# Patient Record
Sex: FEMALE | Race: WHITE | NOT HISPANIC OR LATINO
[De-identification: names, ages, dates, MRNs, and addresses within clinical notes are randomized per-mention and may not be internally consistent; named-entity substitution may affect disease eponyms.]

---

## 2018-01-13 ENCOUNTER — TRANSCRIPTION ENCOUNTER (OUTPATIENT)
Age: 63
End: 2018-01-13

## 2018-06-01 ENCOUNTER — TRANSCRIPTION ENCOUNTER (OUTPATIENT)
Age: 63
End: 2018-06-01

## 2018-07-17 ENCOUNTER — TRANSCRIPTION ENCOUNTER (OUTPATIENT)
Age: 63
End: 2018-07-17

## 2018-10-20 ENCOUNTER — TRANSCRIPTION ENCOUNTER (OUTPATIENT)
Age: 63
End: 2018-10-20

## 2018-11-17 ENCOUNTER — OUTPATIENT (OUTPATIENT)
Dept: OUTPATIENT SERVICES | Facility: HOSPITAL | Age: 63
LOS: 1 days | Discharge: HOME | End: 2018-11-17

## 2018-11-17 DIAGNOSIS — K46.9 UNSPECIFIED ABDOMINAL HERNIA WITHOUT OBSTRUCTION OR GANGRENE: ICD-10-CM

## 2019-10-14 ENCOUNTER — EMERGENCY (EMERGENCY)
Facility: HOSPITAL | Age: 64
LOS: 0 days | Discharge: HOME | End: 2019-10-14
Attending: EMERGENCY MEDICINE | Admitting: EMERGENCY MEDICINE
Payer: OTHER MISCELLANEOUS

## 2019-10-14 VITALS
HEART RATE: 95 BPM | OXYGEN SATURATION: 98 % | DIASTOLIC BLOOD PRESSURE: 88 MMHG | RESPIRATION RATE: 19 BRPM | TEMPERATURE: 98 F | WEIGHT: 169.98 LBS | SYSTOLIC BLOOD PRESSURE: 165 MMHG

## 2019-10-14 DIAGNOSIS — Z88.1 ALLERGY STATUS TO OTHER ANTIBIOTIC AGENTS STATUS: ICD-10-CM

## 2019-10-14 DIAGNOSIS — Z88.0 ALLERGY STATUS TO PENICILLIN: ICD-10-CM
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
## 2019-10-14 DIAGNOSIS — S20.219A CONTUSION OF UNSPECIFIED FRONT WALL OF THORAX, INITIAL ENCOUNTER: ICD-10-CM

## 2019-10-14 DIAGNOSIS — T74.11XA ADULT PHYSICAL ABUSE, CONFIRMED, INITIAL ENCOUNTER: ICD-10-CM

## 2019-10-14 PROCEDURE — 99284 EMERGENCY DEPT VISIT MOD MDM: CPT

## 2019-10-14 PROCEDURE — 71046 X-RAY EXAM CHEST 2 VIEWS: CPT | Mod: 26

## 2019-10-14 RX ORDER — ONDANSETRON 8 MG/1
4 TABLET, FILM COATED ORAL ONCE
Refills: 0 | Status: COMPLETED | OUTPATIENT
Start: 2019-10-14 | End: 2019-10-14

## 2019-10-14 RX ADMIN — ONDANSETRON 4 MILLIGRAM(S): 8 TABLET, FILM COATED ORAL at 22:20

## 2019-10-14 NOTE — ED PROVIDER NOTE - PHYSICAL EXAMINATION
Vital Signs: I have reviewed the initial vital signs.  Constitutional: well-nourished, no acute distress, normocephalic  Cardiovascular: regular rate, regular rhythm, no murmur appreciated  Respiratory: unlabored respiratory effort, clear to auscultation bilaterally  Gastrointestinal: soft, non-tender, non-distended  abdomen, no pulsatile mass  Musculoskeletal: supple neck, no lower extremity edema, lower sternal soreness to palpation,   Integumentary: warm, dry, no rash  Neurologic: awake, alert, cranial nerves II-XII grossly intact, extremities’ motor and sensory functions grossly intact, no focal deficits, GCS 15  Psychiatric: appropriate mood, appropriate affect

## 2019-10-14 NOTE — ED PROVIDER NOTE - PATIENT PORTAL LINK FT
You can access the FollowMyHealth Patient Portal offered by [facility] by registering at the following website: [email or web address]. By joining [facility] FollowMyHealth portal, you will also be able to view your health information using other applications (apps) compatible with our system.

## 2019-10-14 NOTE — ED ADULT NURSE NOTE - NSIMPLEMENTINTERV_GEN_ALL_ED
Implemented All Universal Safety Interventions:  [place] to call system. Call bell, personal items and telephone within reach. Instruct patient to call for assistance. Room bathroom lighting operational. Non-slip footwear when patient is off stretcher. Physically safe environment: no spills, clutter or unnecessary equipment. Stretcher in lowest position, wheels locked, appropriate side rails in place.
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
## 2019-10-14 NOTE — ED PROVIDER NOTE - NS ED ROS FT
Review of Systems    Constitutional: (-) fever/ chills (-) weight loss  Cardiovascular: (-) chest pain, (-) syncope (-) palpitations  Respiratory: (-) cough, (-) shortness of breath  Gastrointestinal: (-) vomiting, (-) diarrhea (-) abdominal pain  Musculoskeletal: (-) neck pain, (-) back pain, (-) joint pain (-) pedal edema, (+) sternal soreness  Integumentary: (-) rash, (-) swelling  Neurological: (-) headache, (-) altered mental status  Psychiatric: (-) hallucinations or depression   Allergic/Immunologic: (-) pruritus
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
## 2019-11-14 ENCOUNTER — APPOINTMENT (OUTPATIENT)
Dept: SURGERY | Facility: CLINIC | Age: 64
End: 2019-11-14
Payer: COMMERCIAL

## 2019-11-14 VITALS — BODY MASS INDEX: 25.77 KG/M2 | WEIGHT: 174 LBS | HEIGHT: 69 IN

## 2019-11-14 PROBLEM — Z00.00 ENCOUNTER FOR PREVENTIVE HEALTH EXAMINATION: Status: ACTIVE | Noted: 2019-11-14

## 2019-11-14 PROCEDURE — 99243 OFF/OP CNSLTJ NEW/EST LOW 30: CPT

## 2019-11-14 NOTE — ASSESSMENT
[FreeTextEntry1] : Phyllis is a pleasant 64-year-old woman presented to the office with her daughter referred by her  (who is a patient of mine) with a past medical history significant for hypertension, cigarette smoking and 4 C-sections in the past along with a laparoscopic cholecystectomy approximately 18 years ago by another surgeon who was diagnosed with a periumbilical hernia one year ago but recently began experiencing significant pain and discomfort in this area which is dramatically worsened over the last 2 weeks.\par \par Physical examination demonstrates a tender plum-sized bulge just above the umbilicus which is not reducible consistent with an incarcerated periumbilical incisional hernia (related to her previous laparoscopic cholecystectomy trocar site incision) warranting surgical repair. There is no evidence of strangulation and the patient denies any symptoms of obstruction. She does have a mild diastasis recti which is most likely related to her four previous full-term pregnancies.\par \par I explained the pros and cons of surgery, as well as all risks, benefits, indications and alternatives of the procedure and the patient understood and agreed.  The patient would like this done as soon as possible in light of progressively worsening symptoms. Phyllis was scheduled for the repair of her incarcerated periumbilical incisional hernia with mesh on Wednesday, December 18, 2019 (and sooner if a spot becomes available) under local with IV sed[facility] at [facility] with presurgical testing waived.  The patient was encouraged to avoid heavy lifting and strenuous activity in the interim, of course.\par \par After our discussion, the patient is aware of the dangers of cigarette smoking, especially with regard to wound healing, wound complications, hernia development and hernia recurrence.  The patient was encouraged to quit or minimize smoking in the perioperative period and has agreed to try.

## 2019-11-14 NOTE — CONSULT LETTER
[Dear  ___] : Dear  [unfilled], [Courtesy Letter:] : I had the pleasure of seeing your patient, [unfilled], in my office today. [Please see my note below.] : Please see my note below. [Consult Closing:] : Thank you very much for allowing me to participate in the care of this patient.  If you have any questions, please do not hesitate to contact me. [FreeTextEntry3] : Respectfully,\par \par Raman Evans M.D., FACS\par

## 2019-11-14 NOTE — PHYSICAL EXAM
[No Rash or Lesion] : No rash or lesion [Normal Breath Sounds] : Normal breath sounds [Calm] : calm [Alert] : alert [JVD] : no jugular venous distention  [de-identified] : healthy [de-identified] : normal [de-identified] : mildly protuberant abdomen, mild diastasis recti\par \par  [de-identified] : large periumbilical incisional hernia, incarcerated

## 2019-12-11 ENCOUNTER — TRANSCRIPTION ENCOUNTER (OUTPATIENT)
Age: 64
End: 2019-12-11

## 2019-12-18 ENCOUNTER — APPOINTMENT (OUTPATIENT)
Dept: SURGERY | Facility: AMBULATORY SURGERY CENTER | Age: 64
End: 2019-12-18
Payer: COMMERCIAL

## 2019-12-18 ENCOUNTER — RESULT REVIEW (OUTPATIENT)
Age: 64
End: 2019-12-18

## 2019-12-18 ENCOUNTER — OUTPATIENT (OUTPATIENT)
Dept: OUTPATIENT SERVICES | Facility: HOSPITAL | Age: 64
LOS: 1 days | Discharge: HOME | End: 2019-12-18
Payer: COMMERCIAL

## 2019-12-18 VITALS
RESPIRATION RATE: 18 BRPM | DIASTOLIC BLOOD PRESSURE: 70 MMHG | HEART RATE: 82 BPM | SYSTOLIC BLOOD PRESSURE: 123 MMHG | OXYGEN SATURATION: 100 %

## 2019-12-18 VITALS
HEART RATE: 94 BPM | TEMPERATURE: 98 F | HEIGHT: 69.5 IN | OXYGEN SATURATION: 97 % | WEIGHT: 171.96 LBS | DIASTOLIC BLOOD PRESSURE: 82 MMHG | SYSTOLIC BLOOD PRESSURE: 145 MMHG | RESPIRATION RATE: 18 BRPM

## 2019-12-18 DIAGNOSIS — Z90.49 ACQUIRED ABSENCE OF OTHER SPECIFIED PARTS OF DIGESTIVE TRACT: Chronic | ICD-10-CM

## 2019-12-18 PROCEDURE — 49561: CPT

## 2019-12-18 PROCEDURE — 88305 TISSUE EXAM BY PATHOLOGIST: CPT | Mod: 26

## 2019-12-18 PROCEDURE — 49568: CPT

## 2019-12-18 RX ORDER — MORPHINE SULFATE 50 MG/1
2 CAPSULE, EXTENDED RELEASE ORAL
Refills: 0 | Status: DISCONTINUED | OUTPATIENT
Start: 2019-12-18 | End: 2019-12-18

## 2019-12-18 RX ORDER — ONDANSETRON 8 MG/1
4 TABLET, FILM COATED ORAL ONCE
Refills: 0 | Status: DISCONTINUED | OUTPATIENT
Start: 2019-12-18 | End: 2020-01-03

## 2019-12-18 RX ORDER — ACETAMINOPHEN 500 MG
1000 TABLET ORAL ONCE
Refills: 0 | Status: COMPLETED | OUTPATIENT
Start: 2019-12-18 | End: 2019-12-18

## 2019-12-18 RX ORDER — SODIUM CHLORIDE 9 MG/ML
1000 INJECTION, SOLUTION INTRAVENOUS
Refills: 0 | Status: DISCONTINUED | OUTPATIENT
Start: 2019-12-18 | End: 2020-01-03

## 2019-12-18 RX ADMIN — SODIUM CHLORIDE 100 MILLILITER(S): 9 INJECTION, SOLUTION INTRAVENOUS at 13:20

## 2019-12-18 RX ADMIN — MORPHINE SULFATE 2 MILLIGRAM(S): 50 CAPSULE, EXTENDED RELEASE ORAL at 13:42

## 2019-12-18 RX ADMIN — Medication 1000 MILLIGRAM(S): at 13:59

## 2019-12-18 RX ADMIN — Medication 400 MILLIGRAM(S): at 13:54

## 2019-12-18 RX ADMIN — MORPHINE SULFATE 2 MILLIGRAM(S): 50 CAPSULE, EXTENDED RELEASE ORAL at 13:38

## 2019-12-18 NOTE — PRE-ANESTHESIA EVALUATION ADULT - NSANTHOSAYNRD_GEN_A_CORE
No. MANDI screening performed.  STOP BANG Legend: 0-2 = LOW Risk; 3-4 = INTERMEDIATE Risk; 5-8 = HIGH Risk

## 2019-12-18 NOTE — CHART NOTE - NSCHARTNOTEFT_GEN_A_CORE
PACU ANESTHESIA ADMISSION NOTE      Procedure: Repair of incarcerated incisional hernia using mesh    Post op diagnosis:  Incarcerated incisional hernia      ____  Intubated  TV:______       Rate: ______      FiO2: ______    ___X_  Patent Airway    X____  Full return of protective reflexes    _X___  Full recovery from anesthesia / back to baseline     Vitals:   T:           R:     16             BP:     1355/64             Sat:       99            P: 85    X  Mental Status:  ___X_ Awake   _X____ Alert   _____ Drowsy   _____ Sedated    Nausea/Vomiting:  __X__ NO  ______Yes,   See Post - Op Orders          Pain Scale (0-10):  _6____    Treatment: ____ None    ____ See Post - Op/PCA Orders    Post - Operative Fluids:   ____ Oral   ____ See Post - Op Orders  LR 600ml    Plan: Discharge:   X____Home       _____Floor     _____Critical Care    _____  Other:_________________    Comments: NAD noted in pacu. vss. handoff given.

## 2019-12-18 NOTE — ASU DISCHARGE PLAN (ADULT/PEDIATRIC) - CARE PROVIDER_API CALL
Raman Evans)  Surgery  [address] [facility] [address]  [place] [address]  Phone: [phone]  Fax: [phone]  Scheduled Appointment: 12/26/2019

## 2019-12-18 NOTE — ASU DISCHARGE PLAN (ADULT/PEDIATRIC) - ASU DC SPECIAL INSTRUCTIONSFT
Diet    Eat light on the day of surgery. Nausea and vomiting can occur after anesthesia,   but usually resolve within 24 hours.  Resume normal diet the following day.      Activity    Rest!  No heavy lifting or strenuous activity.    Medications    Ibuprofen (Advil, Motrin), Naprosyn (Aleve) or Extra-Strength Tylenol for pain.  Vicodin (hydrocodone) for severe pain only.  You have this medication at home already if necessary for severe pain.  Remember, hydrocodone is a strong narcotic pain reliever which can cause drowsiness, upset stomach and constipation.  It should always be taken with food.  You can use stool softener (Mineral Oil) or laxative (MiraLax or Dulcolax) if constipated.  An antibiotic is given during surgery.  No antibiotic needed at home.   Resume all previous medications.  Resume blood thinners the day after surgery unless told otherwise.    Wound Care    Leave surgical dressing in place.  May shower (dressing is waterproof.)  No pool, ocean, lake, hot-tub or   bath for 3 weeks. If you were given an abdominal binder, please wear it as much as possible (day & night)   for 2 weeks, but you must remove it for showers.  Ice packs to the area intermittently for several days help   with pain and swelling.  Bruising (“black and blue”) is common.  Treatment is…Ice & Rest.

## 2019-12-20 LAB — SURGICAL PATHOLOGY STUDY: SIGNIFICANT CHANGE UP

## 2019-12-24 DIAGNOSIS — F17.210 NICOTINE DEPENDENCE, CIGARETTES, UNCOMPLICATED: ICD-10-CM

## 2019-12-24 DIAGNOSIS — Z88.5 ALLERGY STATUS TO NARCOTIC AGENT: ICD-10-CM

## 2019-12-24 DIAGNOSIS — I10 ESSENTIAL (PRIMARY) HYPERTENSION: ICD-10-CM

## 2019-12-24 DIAGNOSIS — Z88.0 ALLERGY STATUS TO PENICILLIN: ICD-10-CM

## 2019-12-24 DIAGNOSIS — K43.0 INCISIONAL HERNIA WITH OBSTRUCTION, WITHOUT GANGRENE: ICD-10-CM

## 2019-12-26 ENCOUNTER — APPOINTMENT (OUTPATIENT)
Dept: SURGERY | Facility: CLINIC | Age: 64
End: 2019-12-26
Payer: COMMERCIAL

## 2019-12-26 DIAGNOSIS — K43.0 INCISIONAL HERNIA WITH OBSTRUCTION, W/OUT GANGRENE: ICD-10-CM

## 2019-12-26 PROCEDURE — 99024 POSTOP FOLLOW-UP VISIT: CPT

## 2019-12-26 NOTE — CONSULT LETTER
[FreeTextEntry1] : Dear Dr. Yossi Ramachandran, \par \par I had the pleasure of seeing your patient, DENISE BILLINGSLEY, in my office today. Please see my note below. \par \par Thank you very much for allowing me to participate in the care of this patient. If you have any questions, please do not hesitate to contact me. \par \par \par Respectfully,\par \par Raman Evans M.D., FACS\par

## 2019-12-26 NOTE — ASSESSMENT
[FreeTextEntry1] : Phyllis underwent the repair of her incarcerated periumbilical incisional hernia with mesh on December 18, 2019 under local with IV sedation without any problems or complications. Her wound is clean, dry and intact. There is no evidence of erythema, seroma formation or infection. She is tolerating a diet and having normal bowel movements. She denies any significant postoperative pain or discomfort at this time.\par \par She was counseled and reassured. She was discharged from the office with no specific followup necessary, but she knows to avoid any heavy lifting or strenuous activity for the next several weeks. We also discussed the importance of calorie restriction and healthy eating with regard to weight loss, hernia recurrence and one's overall health. The patient was encouraged to continue to wear an abdominal binder for the better part of the next month.

## 2020-04-16 ENCOUNTER — TRANSCRIPTION ENCOUNTER (OUTPATIENT)
Age: 65
End: 2020-04-16

## 2021-06-19 ENCOUNTER — TRANSCRIPTION ENCOUNTER (OUTPATIENT)
Age: 66
End: 2021-06-19

## 2022-06-09 PROBLEM — I10 ESSENTIAL (PRIMARY) HYPERTENSION: Chronic | Status: ACTIVE | Noted: 2019-12-18

## 2022-06-27 ENCOUNTER — APPOINTMENT (OUTPATIENT)
Dept: ORTHOPEDIC SURGERY | Facility: CLINIC | Age: 67
End: 2022-06-27

## 2022-07-20 ENCOUNTER — APPOINTMENT (OUTPATIENT)
Dept: PAIN MANAGEMENT | Facility: CLINIC | Age: 67
End: 2022-07-20

## 2022-07-29 DIAGNOSIS — M51.9 UNSPECIFIED THORACIC, THORACOLUMBAR AND LUMBOSACRAL INTERVERTEBRAL DISC DISORDER: ICD-10-CM

## 2022-08-19 ENCOUNTER — APPOINTMENT (OUTPATIENT)
Dept: PAIN MANAGEMENT | Facility: CLINIC | Age: 67
End: 2022-08-19

## 2022-08-19 VITALS
HEIGHT: 69 IN | WEIGHT: 173 LBS | BODY MASS INDEX: 25.62 KG/M2 | DIASTOLIC BLOOD PRESSURE: 90 MMHG | SYSTOLIC BLOOD PRESSURE: 141 MMHG | HEART RATE: 99 BPM

## 2022-08-19 DIAGNOSIS — Z86.79 PERSONAL HISTORY OF OTHER DISEASES OF THE CIRCULATORY SYSTEM: ICD-10-CM

## 2022-08-19 DIAGNOSIS — Z87.39 PERSONAL HISTORY OF OTHER DISEASES OF THE MUSCULOSKELETAL SYSTEM AND CONNECTIVE TISSUE: ICD-10-CM

## 2022-08-19 DIAGNOSIS — Z80.9 FAMILY HISTORY OF MALIGNANT NEOPLASM, UNSPECIFIED: ICD-10-CM

## 2022-08-19 PROCEDURE — 99213 OFFICE O/P EST LOW 20 MIN: CPT

## 2022-08-19 NOTE — PHYSICAL EXAM
[de-identified] : Neck: Palpation of the cervical spine is as follows: bilateral paracervical spasm and bilateral paracervical tenderness. Range of motion of the cervical spine is as follows: diminished range of motion in all planes Stiffness at extremes of extension, rotation to right and rotation to left. Neurological testing for the cervical spine is as follows: positive bilateral facet loading.

## 2022-08-19 NOTE — DISCUSSION/SUMMARY
[de-identified] : 66-year-old female with chronic right-sided neck pain. Patient is aware she cannot undergo another set of JADYN's until November. She defers a repeat cervical MBB/RFA at this time. She will follow up in 2 months for reevaluation. In the interim, she will trial OTC Lidocaine 4% patches. She will continue with methocarbamol and Hydrocodone. She will call barring any issues.\par \par Tonia Mantilla PA-C\par Fermin Harvey MD\par

## 2022-08-19 NOTE — DATA REVIEWED
[FreeTextEntry1] : MRI Cervical - C2-3 disc bulge causing a small ventral impression upon the thecal sac. C3-4 disc herniation which impinges upon the thecal sac. C4-5 disc bulge which impinges upon the thecal sac. C5-6 and C6-7 disc bulges causing moderate ventral impression upon the thecal sac. Mild central canal stenosis at C5-C6 and C6-7. Straightening of the cervical spine. Degenerative disease. C3 disc bulge causing moderate ventral impression upon the thecal sac. She thinks or disc bulge causing small ventral impression upon the thecal sac.

## 2022-08-30 ENCOUNTER — APPOINTMENT (OUTPATIENT)
Dept: ORTHOPEDIC SURGERY | Facility: CLINIC | Age: 67
End: 2022-08-30

## 2022-10-06 ENCOUNTER — APPOINTMENT (OUTPATIENT)
Dept: ORTHOPEDIC SURGERY | Facility: CLINIC | Age: 67
End: 2022-10-06

## 2022-10-21 ENCOUNTER — APPOINTMENT (OUTPATIENT)
Dept: ORTHOPEDIC SURGERY | Facility: CLINIC | Age: 67
End: 2022-10-21
Payer: COMMERCIAL

## 2022-10-21 ENCOUNTER — APPOINTMENT (OUTPATIENT)
Dept: PAIN MANAGEMENT | Facility: CLINIC | Age: 67
End: 2022-10-21

## 2022-10-21 PROCEDURE — 99213 OFFICE O/P EST LOW 20 MIN: CPT

## 2022-10-21 NOTE — ASSESSMENT
[FreeTextEntry1] :  continue pain management may be reasonable to consider cervical and then lumbar injection she did not need medications refilled to today's visit I will see her in 4 months
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
## 2022-12-12 ENCOUNTER — APPOINTMENT (OUTPATIENT)
Dept: PAIN MANAGEMENT | Facility: CLINIC | Age: 67
End: 2022-12-12
Payer: SELF-PAY

## 2022-12-12 VITALS — BODY MASS INDEX: 25.62 KG/M2 | WEIGHT: 173 LBS | HEIGHT: 69 IN

## 2022-12-12 PROCEDURE — 99214 OFFICE O/P EST MOD 30 MIN: CPT

## 2022-12-12 NOTE — ASSESSMENT
[FreeTextEntry1] : 67 year old female presenting with a flare up of cervical radicular pain. Patient is presenting with radicular pain with impairment in ADLs and functionality.  The pain has not responded to conservative care, including medications, stretching, as well as active modalities, such as physical therapy.  Imaging studies as well as physical exam findings corroborate the symptomatology and radicular pain.  We will proceed with an epidural at this point. I will proceed with a cervical epidural steroid injection with sedation. Follow up in 6 weeks will be made for reassessment.\par \par Patient had a MRI that shows a radicular component along with pain referred into the upper extremity. Patient has trialed rehab (Home exercise, physical therapy or chiropractic care) and medications. I will schedule a C7-T1 cervical epidural steroid injection.\par \par Risk, benefits, pros and cons of procedure were explained to the patient using models and diagrams and their questions were answered. \par \par \par The patient has severe anxiety of procedures that necessitates monitored anesthesia care (MAC). The procedure performed will be close to major nerves, arteries, and spinal cord and/or joint structures. Due to the proximity of these structures, we need the patient to be still during the procedure.  With the help of MAC, this will be safely achieved and decrease the risk of any complications.\par \par No medications were given at todays visit.\par \par Entered by Bebe Live, acting as scribe for Dr. Harvey.\par  \par The documentation recorded by the scribe, in my presence, accurately reflects the service I personally performed, and the decisions made by me with my edits as appropriate.\par  \par Best Regards, \par Fermin Harvey MD \par Board Certified, Anesthesiology \par Board Certified, Pain Medicine\par \par \par

## 2022-12-12 NOTE — PHYSICAL EXAM
[de-identified] : Neck: Palpation of the cervical spine is as follows: bilateral paracervical spasm and bilateral paracervical tenderness. Range of motion of the cervical spine is as follows: diminished range of motion in all planes Stiffness at extremes of extension, rotation to right and rotation to left. Neurological testing for the cervical spine is as follows: positive bilateral facet loading.
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
## 2022-12-15 ENCOUNTER — APPOINTMENT (OUTPATIENT)
Dept: PAIN MANAGEMENT | Facility: CLINIC | Age: 67
End: 2022-12-15
Payer: SELF-PAY

## 2022-12-15 PROCEDURE — 99152Z: CUSTOM

## 2022-12-15 PROCEDURE — 72040 X-RAY EXAM NECK SPINE 2-3 VW: CPT

## 2022-12-15 PROCEDURE — 93770 DETERMINATION VENOUS PRESS: CPT

## 2022-12-15 PROCEDURE — 62321 NJX INTERLAMINAR CRV/THRC: CPT

## 2022-12-15 PROCEDURE — 93040 RHYTHM ECG WITH REPORT: CPT | Mod: 59

## 2022-12-15 NOTE — PROCEDURE
[FreeTextEntry1] : CERVICAL EPIDURAL STEROID INJECTION [FreeTextEntry3] : Date:  12/15/2022\par \par Patient: NABEEL BILLINGSLEY\par \par :  1955\par \par \par \par \par \par Preoperative Diagnosis: Cervical radiculopathy\par Postoperative Diagnosis: Cervical radiculopathy\par \par \par Procedure:\par 1. Interlaminar C7-T1 Cervical Epidural Injection under fluoroscopy\par 2. Epidurography\par 3. Fluoroscopic guidance and localization of needle\par \par \par Physician: Fermin Harvey M.D. \par \par Anesthesia: See nurses notes, IV sedation, Versed 2mg, Fentanyl 50 mcg, Zofran 4 mg\par \par \par Medical Necessity:  Failure of conservative management.\par Indication for Fluoroscopy:  This procedure requires the precise placement of the spinal needle into the epidural space.  It is the only way to accurately and safely perform the injection.\par Consent:  Though unusual, the possible complications including infection, bleeding, nerve damage, hospital admission, stroke, pneumothorax, death or failure of the procedure are theoretically possible. The patient was educated about the of the procedure and alternative therapies. All questions were answered and the patient freely gave consent to proceed.\par The patient was also told that sometimes patients will notice upper and/or lower extremity weakness immediately following the procedure due to extravasation of local anesthetic solution onto the main nerve root during the procedure. In addition, the patient was informed of other possible complications such as phrenic nerve injury, weak/heavy head, or muscle injury.  Lastly, the patient was informed that 1 to 2 weeks of perioperative discomfort following the procedure is to be expected.\par \par \par Monitoring:  Patient had continuous blood pressure, EKG, and pulse oximetry throughout the case. See nurse's notes.\par \par \par \par PROCEDURE NOTE: After obtaining written consent, the patient was positioned prone on the operating table. The back to her neck and upper thorax was prepped with Betadine and draped in usual sterile fashion.  A time out was performed.  The C7-T1 interspace was identified using fluoroscopy. The skin was infiltrated with lidocaine 2% -- 1 cc for subcutaneous analgesia.  The epidural space was identified using a 18g touhy needle with a midline approach using a loss of resistance technique. 2cc omnipaque was used to define the space. A solution of 5 ml of preservative-free sterile saline and 1ml of dexamethasone 10mg, 1cc was infused with minimal pressure on the syringe into the epidural space.  The needle tract and tubing were cleared with 2ml of preservative free normal saline. The procedure was tolerated well. There was no evidence of CSF, Paresthesias nor heme. \par \par \par Epidurogram: Distal and proximal spread was noted.\par Findings: Cervical Spine AP and oblique views with x-ray degenerative changes noted.\par \par Complications: none. \par \par Disposition: I have examined the patient and there are no new physical findings since original presentation. The patient was discharged home with a . The discharge instruction sheet was given to the patient. Motor function was intact.\par \par \par \par Comment: 1st JADYN today, depending on effectiveness would schedule 2nd JADYN in 1-2 weeks vs caudal epidural steroid vs follow up in office. Call if any problems\par \par \par \par \par \par Fermin Harvey MD \par Board Certified, Anesthesiology \par Board Certified, Pain Medicine \par \par

## 2023-01-05 ENCOUNTER — APPOINTMENT (OUTPATIENT)
Dept: PAIN MANAGEMENT | Facility: CLINIC | Age: 68
End: 2023-01-05
Payer: MEDICARE

## 2023-01-05 PROCEDURE — 94761 N-INVAS EAR/PLS OXIMETRY MLT: CPT

## 2023-01-05 PROCEDURE — 99152Z: CUSTOM

## 2023-01-05 PROCEDURE — 00600 ANES PX CRV SPINE&CORD NOS: CPT | Mod: QZ,P2

## 2023-01-05 PROCEDURE — 93770 DETERMINATION VENOUS PRESS: CPT

## 2023-01-05 PROCEDURE — 62321 NJX INTERLAMINAR CRV/THRC: CPT

## 2023-01-05 PROCEDURE — 93040 RHYTHM ECG WITH REPORT: CPT | Mod: 59

## 2023-01-05 PROCEDURE — 72040 X-RAY EXAM NECK SPINE 2-3 VW: CPT

## 2023-01-10 ENCOUNTER — APPOINTMENT (OUTPATIENT)
Dept: PAIN MANAGEMENT | Facility: CLINIC | Age: 68
End: 2023-01-10

## 2023-01-12 ENCOUNTER — APPOINTMENT (OUTPATIENT)
Dept: PAIN MANAGEMENT | Facility: CLINIC | Age: 68
End: 2023-01-12
Payer: MEDICARE

## 2023-01-12 VITALS — BODY MASS INDEX: 25.62 KG/M2 | WEIGHT: 173 LBS | HEIGHT: 69 IN

## 2023-01-12 PROCEDURE — 99214 OFFICE O/P EST MOD 30 MIN: CPT
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
## 2023-01-12 NOTE — PHYSICAL EXAM
[de-identified] : Neck: Palpation of the cervical spine is as follows: bilateral paracervical spasm and bilateral paracervical tenderness. Range of motion of the cervical spine is as follows: diminished range of motion in all planes Stiffness at extremes of extension, rotation to right and rotation to left. Neurological testing for the cervical spine is as follows: positive bilateral facet loading.

## 2023-01-12 NOTE — ASSESSMENT
[FreeTextEntry1] : 67 year old female presenting with cervical radicular pain. She is scheduled for a repeat cervical epidural injection. She will follow up after. \par \par Entered by Bebe Live, acting as scribe for Dr. Harvey.\par  \par The documentation recorded by the scribe, in my presence, accurately reflects the service I personally performed, and the decisions made by me with my edits as appropriate.\par  \par Best Regards, \par Fermin Harvey MD \par Board Certified, Anesthesiology \par Board Certified, Pain Medicine\par \par \par

## 2023-01-18 ENCOUNTER — APPOINTMENT (OUTPATIENT)
Dept: PAIN MANAGEMENT | Facility: CLINIC | Age: 68
End: 2023-01-18
Payer: MEDICARE

## 2023-01-18 PROCEDURE — 93040 RHYTHM ECG WITH REPORT: CPT | Mod: 59

## 2023-01-18 PROCEDURE — 93770 DETERMINATION VENOUS PRESS: CPT | Mod: 59

## 2023-01-18 PROCEDURE — 72040 X-RAY EXAM NECK SPINE 2-3 VW: CPT

## 2023-01-18 PROCEDURE — 00600 ANES PX CRV SPINE&CORD NOS: CPT | Mod: QZ,P2

## 2023-01-18 PROCEDURE — 94761 N-INVAS EAR/PLS OXIMETRY MLT: CPT

## 2023-01-18 PROCEDURE — 62321 NJX INTERLAMINAR CRV/THRC: CPT

## 2023-01-18 NOTE — PROCEDURE
[FreeTextEntry1] : CERVICAL EPIDURAL STEROID INJECTION [FreeTextEntry3] : Date:  2023\par \par Patient: NABEEL BILLINGSLEY\par \par :  1955\par \par \par \par \par \par Preoperative Diagnosis: Cervical radiculopathy\par Postoperative Diagnosis: Cervical radiculopathy\par \par \par Procedure:\par 1. Interlaminar C7-T1 Cervical Epidural Injection under fluoroscopy\par 2. Epidurography\par 3. Fluoroscopic guidance and localization of needle\par \par \par Physician: Fermin Harvey M.D. \par Anesthesiologist/CRNA: Ms. Garcia \par Anesthesia: MAC, See nurses notes, Versed 4mg, Fentanyl 100 mcg\par \par \par Medical Necessity:  Failure of conservative management.\par Indication for Fluoroscopy:  This procedure requires the precise placement of the spinal needle into the epidural space.  It is the only way to accurately and safely perform the injection.\par Consent:  Though unusual, the possible complications including infection, bleeding, nerve damage, hospital admission, stroke, pneumothorax, death or failure of the procedure are theoretically possible. The patient was educated about the of the procedure and alternative therapies. All questions were answered and the patient freely gave consent to proceed.\par The patient was also told that sometimes patients will notice upper and/or lower extremity weakness immediately following the procedure due to extravasation of local anesthetic solution onto the main nerve root during the procedure. In addition, the patient was informed of other possible complications such as phrenic nerve injury, weak/heavy head, or muscle injury.  Lastly, the patient was informed that 1 to 2 weeks of perioperative discomfort following the procedure is to be expected.\par \par \par Monitoring:  Patient had continuous blood pressure, EKG, and pulse oximetry throughout the case. See nurse's notes.\par \par \par \par PROCEDURE NOTE: After obtaining written consent, the patient was positioned prone on the operating table. The back to her neck and upper thorax was prepped with Betadine and draped in usual sterile fashion.  A time out was performed.  The C7-T1 interspace was identified using fluoroscopy. The skin was infiltrated with lidocaine 2% -- 1 cc for subcutaneous analgesia.  The epidural space was identified using a 18g touhy needle with a midline approach using a loss of resistance technique. 2cc omnipaque was used to define the space. A solution of 5 ml of preservative-free sterile saline and 1ml of dexamethasone 10mg, 1cc was infused with minimal pressure on the syringe into the epidural space.  The needle tract and tubing were cleared with 2ml of preservative free normal saline. The procedure was tolerated well. There was no evidence of CSF, Paresthesias nor heme. \par \par \par Epidurogram: Distal and proximal spread was noted.\par Findings: Cervical Spine AP and oblique views with x-ray degenerative changes noted.\par \par Complications: none. \par \par Disposition: I have examined the patient and there are no new physical findings since original presentation. The patient was discharged home with a . The discharge instruction sheet was given to the patient. Motor function was intact.\par \par \par \par Comment: 3rd JADYN today,   follow up in office. Call if any problems\par \par \par \par \par \par Fermin Harvey MD \par Board Certified, Anesthesiology \par Board Certified, Pain Medicine \par \par

## 2023-02-01 ENCOUNTER — APPOINTMENT (OUTPATIENT)
Dept: PAIN MANAGEMENT | Facility: CLINIC | Age: 68
End: 2023-02-01

## 2023-02-07 ENCOUNTER — APPOINTMENT (OUTPATIENT)
Dept: PAIN MANAGEMENT | Facility: CLINIC | Age: 68
End: 2023-02-07
Payer: MEDICARE

## 2023-02-07 VITALS — BODY MASS INDEX: 25.62 KG/M2 | WEIGHT: 173 LBS | HEIGHT: 69 IN

## 2023-02-07 PROCEDURE — 99214 OFFICE O/P EST MOD 30 MIN: CPT

## 2023-02-07 NOTE — ASSESSMENT
[FreeTextEntry1] : 67 year old female presenting with improving neck pain following a epidural injections. She was advised to continue with conservative care. Follow up in 6 weeks will be made for reassessment.\par \par \par Entered by Bebe Live, acting as scribe for Dr. Harvey.\par  \par The documentation recorded by the scribe, in my presence, accurately reflects the service I personally performed, and the decisions made by me with my edits as appropriate.\par  \par Best Regards, \par Fermin Harvey MD \par Board Certified, Anesthesiology \par Board Certified, Pain Medicine\par \par \par
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
## 2023-02-28 ENCOUNTER — EMERGENCY (EMERGENCY)
Facility: HOSPITAL | Age: 68
LOS: 0 days | Discharge: LEFT BEFORE DISCHARGE | End: 2023-02-28
Attending: EMERGENCY MEDICINE
Payer: MEDICARE

## 2023-02-28 VITALS
RESPIRATION RATE: 18 BRPM | TEMPERATURE: 98 F | DIASTOLIC BLOOD PRESSURE: 78 MMHG | HEART RATE: 76 BPM | WEIGHT: 167.99 LBS | HEIGHT: 69 IN | SYSTOLIC BLOOD PRESSURE: 145 MMHG | OXYGEN SATURATION: 98 %

## 2023-02-28 VITALS — SYSTOLIC BLOOD PRESSURE: 122 MMHG | HEART RATE: 74 BPM | DIASTOLIC BLOOD PRESSURE: 73 MMHG

## 2023-02-28 DIAGNOSIS — R07.89 OTHER CHEST PAIN: ICD-10-CM

## 2023-02-28 DIAGNOSIS — Z88.8 ALLERGY STATUS TO OTHER DRUGS, MEDICAMENTS AND BIOLOGICAL SUBSTANCES STATUS: ICD-10-CM

## 2023-02-28 DIAGNOSIS — Z90.49 ACQUIRED ABSENCE OF OTHER SPECIFIED PARTS OF DIGESTIVE TRACT: Chronic | ICD-10-CM

## 2023-02-28 DIAGNOSIS — Z88.1 ALLERGY STATUS TO OTHER ANTIBIOTIC AGENTS STATUS: ICD-10-CM

## 2023-02-28 DIAGNOSIS — R74.8 ABNORMAL LEVELS OF OTHER SERUM ENZYMES: ICD-10-CM

## 2023-02-28 DIAGNOSIS — I10 ESSENTIAL (PRIMARY) HYPERTENSION: ICD-10-CM

## 2023-02-28 DIAGNOSIS — Z88.0 ALLERGY STATUS TO PENICILLIN: ICD-10-CM

## 2023-02-28 DIAGNOSIS — I21.4 NON-ST ELEVATION (NSTEMI) MYOCARDIAL INFARCTION: ICD-10-CM

## 2023-02-28 DIAGNOSIS — F17.200 NICOTINE DEPENDENCE, UNSPECIFIED, UNCOMPLICATED: ICD-10-CM

## 2023-02-28 DIAGNOSIS — Z20.822 CONTACT WITH AND (SUSPECTED) EXPOSURE TO COVID-19: ICD-10-CM

## 2023-02-28 LAB
SARS-COV-2 RNA SPEC QL NAA+PROBE: SIGNIFICANT CHANGE UP
TROPONIN T SERPL-MCNC: 0.12 NG/ML — CRITICAL HIGH

## 2023-02-28 PROCEDURE — 36415 COLL VENOUS BLD VENIPUNCTURE: CPT

## 2023-02-28 PROCEDURE — 85025 COMPLETE CBC W/AUTO DIFF WBC: CPT

## 2023-02-28 PROCEDURE — 71045 X-RAY EXAM CHEST 1 VIEW: CPT

## 2023-02-28 PROCEDURE — 83735 ASSAY OF MAGNESIUM: CPT

## 2023-02-28 PROCEDURE — 83690 ASSAY OF LIPASE: CPT

## 2023-02-28 PROCEDURE — 99285 EMERGENCY DEPT VISIT HI MDM: CPT

## 2023-02-28 PROCEDURE — 99285 EMERGENCY DEPT VISIT HI MDM: CPT | Mod: 25

## 2023-02-28 PROCEDURE — C9113: CPT

## 2023-02-28 PROCEDURE — 71045 X-RAY EXAM CHEST 1 VIEW: CPT | Mod: 26

## 2023-02-28 PROCEDURE — 93005 ELECTROCARDIOGRAM TRACING: CPT

## 2023-02-28 PROCEDURE — 80053 COMPREHEN METABOLIC PANEL: CPT

## 2023-02-28 PROCEDURE — 84484 ASSAY OF TROPONIN QUANT: CPT

## 2023-02-28 PROCEDURE — 87635 SARS-COV-2 COVID-19 AMP PRB: CPT

## 2023-02-28 PROCEDURE — 96374 THER/PROPH/DIAG INJ IV PUSH: CPT

## 2023-02-28 PROCEDURE — 93010 ELECTROCARDIOGRAM REPORT: CPT

## 2023-02-28 RX ORDER — PANTOPRAZOLE SODIUM 20 MG/1
40 TABLET, DELAYED RELEASE ORAL ONCE
Refills: 0 | Status: COMPLETED | OUTPATIENT
Start: 2023-02-28 | End: 2023-02-28

## 2023-02-28 RX ORDER — FAMOTIDINE 10 MG/ML
20 INJECTION INTRAVENOUS ONCE
Refills: 0 | Status: DISCONTINUED | OUTPATIENT
Start: 2023-02-28 | End: 2023-02-28

## 2023-02-28 RX ORDER — ASPIRIN/CALCIUM CARB/MAGNESIUM 324 MG
142 TABLET ORAL ONCE
Refills: 0 | Status: DISCONTINUED | OUTPATIENT
Start: 2023-02-28 | End: 2023-02-28

## 2023-02-28 RX ORDER — DIPHENHYDRAMINE HYDROCHLORIDE AND LIDOCAINE HYDROCHLORIDE AND ALUMINUM HYDROXIDE AND MAGNESIUM HYDRO
30 KIT ONCE
Refills: 0 | Status: COMPLETED | OUTPATIENT
Start: 2023-02-28 | End: 2023-02-28

## 2023-02-28 RX ADMIN — PANTOPRAZOLE SODIUM 40 MILLIGRAM(S): 20 TABLET, DELAYED RELEASE ORAL at 20:16

## 2023-02-28 RX ADMIN — DIPHENHYDRAMINE HYDROCHLORIDE AND LIDOCAINE HYDROCHLORIDE AND ALUMINUM HYDROXIDE AND MAGNESIUM HYDRO 30 MILLILITER(S): KIT at 20:18

## 2023-02-28 NOTE — ED ADULT TRIAGE NOTE - ARRIVAL FROM
[facility]  Daily Note   Name:  Kartik Espinosa  Medical Record Number: [id number]   Note Date: 2017                                              Date/Time:  2017 10:38:00   DOL: 4  Pos-Mens Age:  33wk 3d  Birth Gest: 32wk 6d   2017  Birth Weight:  2028 (gms)  Daily Physical Exam   Today's Weight: 2000 (gms)  Chg 24 hrs: 10  Chg 7 days:  --   Head Circ:  30 (cm)  Date: 2017  Change:  0 (cm)  Length:  45.5 (cm)  Change:  0 (cm)   Temperature Heart Rate Resp Rate BP - Sys BP - Andrade BP - Mean O2 Sats   37 141 63 68 42 51 93  Intensive cardiac and respiratory monitoring, continuous and/or frequent vital sign monitoring.   Bed Type:  Incubator   Head/Neck:  Normocephalic.  Anterior fontanelle soft and flat.  Suture lines open, opposed.  Bubble nasal CPAP in  place and bubbling well.   Chest:  Chest symmetrical.  Clear breath sounds. Fair air entry.   Heart:  Regular rate and rhythm; no murmur heard; brachial  and  femoral pulses 2-3+ and equal bilaterally; CFT  2-3 seconds.   Abdomen:  Abdomen soft and flat with diminished bowel sounds.  No masses or organomegaly palpated.     Genitalia:  Normal  external genitalia.  Testes palpable in inguinal canal bilaterally.     Extremities  Symmetrical movements   Neurologic:  Responsive with exam.  Muscle tone appropriate for gestation.     Skin:  Skin smooth, pink, warm, and intact.  No bruising. No rashes, birthmarks, or lesions noted.  Respiratory Support   Respiratory Support Start Date Stop Date Dur(d)                                       Comment   Nasal CPAP 2017 5  Settings for Nasal CPAP  FiO2 CPAP  0.3 5   Procedures   Start Date Stop Date Dur(d)Clinician Comment   Phototherapy 2017 4  Peripherally Inserted Central 2017 3 RN  Catheter  Labs   Chem1 Time Na K Cl CO2 BUN Cr Glu BS Glu Ca   2017 05:13 137 4.8 110 21 13 0.48 99 10.1   Liver Function Time T Bili D Bili Blood  Home Type Pam AST ALT GGT LDH NH3 Lactate   2017 05:13 8.1 0.5 25 6   Chem2 Time iCa Osm Phos Mg TG Alk Phos T Prot Alb Pre Alb   2017 05:13 5.0 2.3 104 107 4.9 3.1  Intake/Output  Actual Intake     Fluid Type Avelino/oz Dex % Prot g/kg Prot g/100mL Amount Comment  Intralipid 20% 24.8    Breast Milk-Everton 19 69    Planned Intake Prot Prot feeds/  Fluid Type Avelino/oz Dex % g/kg g/100mL Amt mL/feed day mL/hr mL/kg/day Comment  TPN 12 156 6.5 78  Breast Milk-Everton 19 96 12 8 48  Intralipid 20% 28.8 1.2 14 3g/kg/d  Output   Urine Amount:160 mL 3.3 mL/kg/hr Calculation:24 hrs  Total Output:   160 mL 3.3 mL/kg/hr 80.0 mL/kg/day Calculation:24 hrs  Stools: x3  Nutritional Support   Diagnosis Start Date End Date  Nutritional Support 2017   History   32.6 week preemie, AGA.  Initial accucheck 55.  Placed IV and started D10 vanilla TPN.  11/10 lytes WNL  11/12  tolerating feeds   Plan   increase feeds per protocol.  PICC D10 TPN.  ml/kg/day. Accuchecks per routine.  Mother plans to breastfeed  and is pumping.  >1250 and low risk for NEC protocol.  Hyperbilirubinemia   Diagnosis Start Date End Date  At risk for Hyperbilirubinemia 2017   History   32 week preemie. Maternal and baby's blood type O.  11/10 TB 7.1  11/11 TB 8.4 under phototherapy  11/12 TB 7.9   Plan   continue phototherapy.  T bili in am   Respiratory Distress Syndrome   Diagnosis Start Date End Date  Respiratory Distress Syndrome 2017   History   32 week preemie, mom got steroids x2 doses 4 and 5 days prior to delivery.  Resp distress after delivery, gave mask  CPAP x1 minute, no PPV, and then placed bubble CPAP in OR. Transferred to NICU on 50% FiO2 and able to wean     slowly down to 30% FiO2, continues to have moderate resp distress.  Chest xray shows bilateral diffuse reticulogranular  appearance consistent with RDS.  11/10 CPAP not bubbling well this am, sizing of interface changed and now bubbling  well. Was up to 40% O2 but now  weaning back down. CXR fairly clear, much improved from yesterday   down to  24%O2 on CPAP 6   surfactant given x 1 yesterday morning. in 30% O2 now, CPAP 5, CXR fairly clear   Plan   Continue CPAP 5  At risk for Intraventricular Hemorrhage   Diagnosis Start Date End Date  At risk for Intraventricular Hemorrhage 2017   History   32 week preemie,  for maternal PIH; uncomplicated resuscitation.     Plan   Screening cranial US first week of life  Prematurity   Diagnosis Start Date End Date  Prematurity 4036-8322 gm 2017   History   Delivered at 32 6/7 weeks due to maternal pre-eclampsia.   Plan   Care and screens appropriate for 32 week preemie.  Parental Support   Diagnosis Start Date End Date  Parental Support 2017   History   Parents are  and have 2 other children, both healthy, on boy and one girl, ages 3 and 6yo. Father signed  consents for treatment in NICU and PICC.   Plan   Keep updated.  Plan admission conference.  Health Maintenance   Maternal Labs  RPR/Serology: Non-Reactive  HIV: Negative  Rubella: Immune  GBS:  Unknown  HBsAg:  Negative  ___________________________________________  Heri Humphrey MD

## 2023-02-28 NOTE — ED ADULT TRIAGE NOTE - CHIEF COMPLAINT QUOTE
complains of heartburn since yesterday, with some relief of Pepcid, refused nitro from ems and only took 2 baby aspirin from ems

## 2023-02-28 NOTE — ED PROVIDER NOTE - OBJECTIVE STATEMENT
67 year old female past medical history of Hypertension, smoker, FH of heart disease comes to emergency room for chest pain x since yesterday. patient states that yesterday after eating developed burning chest pain that has been constant since. patient states that she took pepcid and had some relief of pain. patient states that tonight after taking pepto that the pain got worse and she called her friend and was told to come to hospital. patient called 911 and EMS gave 2 baby ASA and patient refused nitro. no shortness of breath, no back pain, no jaw pain. patient denies leg pain and swelling

## 2023-02-28 NOTE — ED PROVIDER NOTE - IV ALTEPLASE EXCL ABS HIDDEN
"Patient reports increased urination and \"Very tender breasts\".  Not really having N/V.  No cramping or VB.  Exam unremarkable.  PAP not done since it is up-to-date.  Patient reports significant difficulty with pelvic exams (could not tolerate TVUS today)  New OB information reviewed.  Labs today.  Patient on Lexapro 10 mg q day.  She was previously on Ativan q day, but has not been taking since finding out she was pregnant - I have advised her to only use prn.   withdrawal discussed.  No other medical conditions or medications.  Non-smoker  " show

## 2023-02-28 NOTE — ED ADULT NURSE NOTE - NSFALLRSKHARMRISK_ED_ALL_ED
Patient aware of date, time and place of Covid test, and informed that once they get their COVID test :   • Do NOT travel out of home area   • Self -quarantine is recommended to minimizes your risk of exposure before your procedure, if you are unable to self-quarantine, please continue to wear a mask, practice social distancing and perform good hand hygiene.  • Immediately report any new symptoms or suspected/known exposures to COVID-19 cases  to your surgeon’s office  o fever of 100.4 or more  o new cough, or cough that gets worse  o difficulty breathing  o sore throat  o stomach problems: nausea, vomiting or diarrhea  o loss of taste or smell  o chills and/or repeated shaking with chills  o muscle pain  o headache  • Maintain physical distancing (at least 6 feet) at all times both at home and away from home  • Wash hands frequently and thoroughly with soap and water (lather at least 20 seconds) or disinfect with alcohol-based hand  before eating.  This should also be done by the person who will be bringing you to and from the procedure.  • Only one visitor allowed into building. They must remain in designated area assigned by the nursing staff and can not eat in the facility.  The visitor may have a drink if it is covered with a lid or cap.  Please do not drink while care team members are in the room.  • It will be an expectation for the patient/support person to wear a mask at all times during their stay.  Patient is aware they will not get notified of a negative result               no

## 2023-02-28 NOTE — ED PROVIDER NOTE - CLINICAL SUMMARY MEDICAL DECISION MAKING FREE TEXT BOX
Pt placed on cardiac monitor. Labs, EKG and CXR obtained.  Pt found with elevated troponin.  Pt was initially feeling better after ED treatment, however pain returned.  Repeat EKG done.  Pt with elevated troponin.  Critical care consulted.  Pt made aware of results and need for admission.

## 2023-02-28 NOTE — ED PROVIDER NOTE - NS ED ATTENDING STATEMENT MOD
This was a shared visit with the CASEY. I reviewed and verified the documentation and independently performed the documented:

## 2023-02-28 NOTE — ED PROVIDER NOTE - ATTENDING APP SHARED VISIT CONTRIBUTION OF CARE
66 yo F PMHx HTN on Metroprolol, smoker presents with burning chest pain that started yesterday after eating. Pain started initially after eating. Pain felt worse tonight.  Pt received 2 aspirin by EMS, no SOB. no n/v.  On exam pt in NAD AAO x 3, speech is clear, Lungs cta b/l no wrr, no edema, no calf tenderness.

## 2023-02-28 NOTE — ED PROVIDER NOTE - PROGRESS NOTE DETAILS
Patient reports relief of symptoms after protonix and first mouth wash D/w patient elevated troponin and need to stay in hospital for cardiac monitoring and to see cardiology. Questions answered and d/w ICU consulted Dr. Sparks and NATHAN Jaime aware of consult. Pt wanted to go outside to get fresh air.  Pt was told by staff that she was unable to leave ED.  ICU PA was at bedside and we tried to convince pt to stay.  Informed by RN that pt removed her IV and left D/w patient elevated troponin consistent for myocardial infarction/heart attack (NSTEMI) and need to stay in hospital for cardiac monitoring and to see cardiology. Nurse Caitlin Knight present for conversation. Questions answered and d/w ICU consulted Dr. Sparks and NATHAN Jaime aware of consult.

## 2023-02-28 NOTE — ED PROVIDER NOTE - CHILD ABUSE FACILITY
[facility]: The defect edges were debeveled with a #15 scalpel blade.  Given the location of the defect and the proximity to free margins a bilobed transposition flap was deemed most appropriate.  Using a sterile surgical marker, an appropriate bilobe flap drawn around the defect.    The area thus outlined was incised deep to adipose tissue with a #15 scalpel blade.  The skin margins were undermined to an appropriate distance in all directions utilizing iris scissors.

## 2023-02-28 NOTE — ED ADULT NURSE REASSESSMENT NOTE - NS ED NURSE REASSESS COMMENT FT1
Patient requesting to leave. Extensive conversation had as to risk of leaving and recommended continuing plan of  care. Patient refused and demanding IVL be removed. NATHAN Olea and Dr Carpio made aware. Patient educated to return if symptoms worsen. IVL removed patient ambulating w/ a steady gait A and O x 4.

## 2023-03-01 ENCOUNTER — INPATIENT (INPATIENT)
Facility: HOSPITAL | Age: 68
LOS: 0 days | Discharge: HOME | DRG: 246 | End: 2023-03-02
Attending: INTERNAL MEDICINE | Admitting: INTERNAL MEDICINE
Payer: MEDICARE

## 2023-03-01 VITALS
HEART RATE: 94 BPM | DIASTOLIC BLOOD PRESSURE: 72 MMHG | WEIGHT: 167.99 LBS | SYSTOLIC BLOOD PRESSURE: 121 MMHG | HEIGHT: 69 IN | TEMPERATURE: 99 F | RESPIRATION RATE: 20 BRPM | OXYGEN SATURATION: 98 %

## 2023-03-01 DIAGNOSIS — Z90.49 ACQUIRED ABSENCE OF OTHER SPECIFIED PARTS OF DIGESTIVE TRACT: Chronic | ICD-10-CM

## 2023-03-01 DIAGNOSIS — I21.4 NON-ST ELEVATION (NSTEMI) MYOCARDIAL INFARCTION: ICD-10-CM

## 2023-03-01 LAB
APTT BLD: 28.2 SEC — SIGNIFICANT CHANGE UP (ref 27–39.2)
APTT BLD: 41.1 SEC — HIGH (ref 27–39.2)
GLUCOSE BLDC GLUCOMTR-MCNC: 94 MG/DL — SIGNIFICANT CHANGE UP (ref 70–99)
INR BLD: 0.95 RATIO — SIGNIFICANT CHANGE UP (ref 0.65–1.3)
NT-PROBNP SERPL-SCNC: 6761 PG/ML — HIGH (ref 0–300)
PROTHROM AB SERPL-ACNC: 10.8 SEC — SIGNIFICANT CHANGE UP (ref 9.95–12.87)
TROPONIN T SERPL-MCNC: 0.4 NG/ML — CRITICAL HIGH
TROPONIN T SERPL-MCNC: 0.42 NG/ML — CRITICAL HIGH

## 2023-03-01 PROCEDURE — 93458 L HRT ARTERY/VENTRICLE ANGIO: CPT | Mod: 59

## 2023-03-01 PROCEDURE — C1887: CPT

## 2023-03-01 PROCEDURE — 36415 COLL VENOUS BLD VENIPUNCTURE: CPT

## 2023-03-01 PROCEDURE — C1725: CPT

## 2023-03-01 PROCEDURE — 93010 ELECTROCARDIOGRAM REPORT: CPT

## 2023-03-01 PROCEDURE — 93005 ELECTROCARDIOGRAM TRACING: CPT

## 2023-03-01 PROCEDURE — 93306 TTE W/DOPPLER COMPLETE: CPT | Mod: 26

## 2023-03-01 PROCEDURE — 84443 ASSAY THYROID STIM HORMONE: CPT

## 2023-03-01 PROCEDURE — C1753: CPT

## 2023-03-01 PROCEDURE — 85730 THROMBOPLASTIN TIME PARTIAL: CPT

## 2023-03-01 PROCEDURE — C1894: CPT

## 2023-03-01 PROCEDURE — 93010 ELECTROCARDIOGRAM REPORT: CPT | Mod: 77

## 2023-03-01 PROCEDURE — 99223 1ST HOSP IP/OBS HIGH 75: CPT

## 2023-03-01 PROCEDURE — 93306 TTE W/DOPPLER COMPLETE: CPT

## 2023-03-01 PROCEDURE — 99285 EMERGENCY DEPT VISIT HI MDM: CPT | Mod: FS

## 2023-03-01 PROCEDURE — 92928 PRQ TCAT PLMT NTRAC ST 1 LES: CPT | Mod: LD

## 2023-03-01 PROCEDURE — 80061 LIPID PANEL: CPT

## 2023-03-01 PROCEDURE — 93458 L HRT ARTERY/VENTRICLE ANGIO: CPT | Mod: 26,XU

## 2023-03-01 PROCEDURE — C1769: CPT

## 2023-03-01 PROCEDURE — 83036 HEMOGLOBIN GLYCOSYLATED A1C: CPT

## 2023-03-01 PROCEDURE — 92978 ENDOLUMINL IVUS OCT C 1ST: CPT | Mod: LD

## 2023-03-01 PROCEDURE — 86803 HEPATITIS C AB TEST: CPT

## 2023-03-01 PROCEDURE — C1874: CPT

## 2023-03-01 PROCEDURE — 80053 COMPREHEN METABOLIC PANEL: CPT

## 2023-03-01 PROCEDURE — 84484 ASSAY OF TROPONIN QUANT: CPT

## 2023-03-01 PROCEDURE — 82962 GLUCOSE BLOOD TEST: CPT

## 2023-03-01 PROCEDURE — 85025 COMPLETE CBC W/AUTO DIFF WBC: CPT

## 2023-03-01 PROCEDURE — 92978 ENDOLUMINL IVUS OCT C 1ST: CPT | Mod: 26,LD

## 2023-03-01 PROCEDURE — 83735 ASSAY OF MAGNESIUM: CPT

## 2023-03-01 RX ORDER — ASPIRIN/CALCIUM CARB/MAGNESIUM 324 MG
81 TABLET ORAL DAILY
Refills: 0 | Status: DISCONTINUED | OUTPATIENT
Start: 2023-03-01 | End: 2023-03-02

## 2023-03-01 RX ORDER — HEPARIN SODIUM 5000 [USP'U]/ML
INJECTION INTRAVENOUS; SUBCUTANEOUS
Qty: 25000 | Refills: 0 | Status: DISCONTINUED | OUTPATIENT
Start: 2023-03-01 | End: 2023-03-01

## 2023-03-01 RX ORDER — INFLUENZA VIRUS VACCINE 15; 15; 15; 15 UG/.5ML; UG/.5ML; UG/.5ML; UG/.5ML
0.7 SUSPENSION INTRAMUSCULAR ONCE
Refills: 0 | Status: DISCONTINUED | OUTPATIENT
Start: 2023-03-01 | End: 2023-03-02

## 2023-03-01 RX ORDER — ONDANSETRON 8 MG/1
4 TABLET, FILM COATED ORAL EVERY 8 HOURS
Refills: 0 | Status: DISCONTINUED | OUTPATIENT
Start: 2023-03-01 | End: 2023-03-02

## 2023-03-01 RX ORDER — NITROGLYCERIN 6.5 MG
0.4 CAPSULE, EXTENDED RELEASE ORAL
Refills: 0 | Status: DISCONTINUED | OUTPATIENT
Start: 2023-03-01 | End: 2023-03-01

## 2023-03-01 RX ORDER — LANOLIN ALCOHOL/MO/W.PET/CERES
3 CREAM (GRAM) TOPICAL AT BEDTIME
Refills: 0 | Status: DISCONTINUED | OUTPATIENT
Start: 2023-03-01 | End: 2023-03-02

## 2023-03-01 RX ORDER — ONDANSETRON 8 MG/1
4 TABLET, FILM COATED ORAL ONCE
Refills: 0 | Status: COMPLETED | OUTPATIENT
Start: 2023-03-01 | End: 2023-03-01

## 2023-03-01 RX ORDER — ATORVASTATIN CALCIUM 80 MG/1
80 TABLET, FILM COATED ORAL AT BEDTIME
Refills: 0 | Status: DISCONTINUED | OUTPATIENT
Start: 2023-03-01 | End: 2023-03-02

## 2023-03-01 RX ORDER — HEPARIN SODIUM 5000 [USP'U]/ML
4700 INJECTION INTRAVENOUS; SUBCUTANEOUS EVERY 6 HOURS
Refills: 0 | Status: DISCONTINUED | OUTPATIENT
Start: 2023-03-01 | End: 2023-03-01

## 2023-03-01 RX ORDER — TICAGRELOR 90 MG/1
90 TABLET ORAL EVERY 12 HOURS
Refills: 0 | Status: DISCONTINUED | OUTPATIENT
Start: 2023-03-02 | End: 2023-03-02

## 2023-03-01 RX ORDER — SODIUM CHLORIDE 9 MG/ML
1000 INJECTION INTRAMUSCULAR; INTRAVENOUS; SUBCUTANEOUS
Refills: 0 | Status: DISCONTINUED | OUTPATIENT
Start: 2023-03-01 | End: 2023-03-01

## 2023-03-01 RX ORDER — MORPHINE SULFATE 50 MG/1
2 CAPSULE, EXTENDED RELEASE ORAL ONCE
Refills: 0 | Status: DISCONTINUED | OUTPATIENT
Start: 2023-03-01 | End: 2023-03-01

## 2023-03-01 RX ORDER — TICAGRELOR 90 MG/1
1 TABLET ORAL
Qty: 60 | Refills: 3
Start: 2023-03-01 | End: 2023-06-28

## 2023-03-01 RX ORDER — ACETAMINOPHEN 500 MG
650 TABLET ORAL EVERY 6 HOURS
Refills: 0 | Status: DISCONTINUED | OUTPATIENT
Start: 2023-03-01 | End: 2023-03-02

## 2023-03-01 RX ORDER — SODIUM CHLORIDE 9 MG/ML
1000 INJECTION INTRAMUSCULAR; INTRAVENOUS; SUBCUTANEOUS
Refills: 0 | Status: DISCONTINUED | OUTPATIENT
Start: 2023-03-01 | End: 2023-03-02

## 2023-03-01 RX ORDER — ASPIRIN/CALCIUM CARB/MAGNESIUM 324 MG
324 TABLET ORAL ONCE
Refills: 0 | Status: COMPLETED | OUTPATIENT
Start: 2023-03-01 | End: 2023-03-01

## 2023-03-01 RX ORDER — TICAGRELOR 90 MG/1
1 TABLET ORAL
Qty: 60 | Refills: 0
Start: 2023-03-01 | End: 2023-03-30

## 2023-03-01 RX ORDER — METOPROLOL TARTRATE 50 MG
12.5 TABLET ORAL EVERY 12 HOURS
Refills: 0 | Status: DISCONTINUED | OUTPATIENT
Start: 2023-03-01 | End: 2023-03-02

## 2023-03-01 RX ORDER — METOPROLOL TARTRATE 50 MG
25 TABLET ORAL DAILY
Refills: 0 | Status: DISCONTINUED | OUTPATIENT
Start: 2023-03-01 | End: 2023-03-01

## 2023-03-01 RX ADMIN — ATORVASTATIN CALCIUM 80 MILLIGRAM(S): 80 TABLET, FILM COATED ORAL at 21:57

## 2023-03-01 RX ADMIN — Medication 30 MILLILITER(S): at 09:11

## 2023-03-01 RX ADMIN — Medication 12.5 MILLIGRAM(S): at 18:06

## 2023-03-01 RX ADMIN — ONDANSETRON 4 MILLIGRAM(S): 8 TABLET, FILM COATED ORAL at 06:55

## 2023-03-01 RX ADMIN — ONDANSETRON 4 MILLIGRAM(S): 8 TABLET, FILM COATED ORAL at 15:51

## 2023-03-01 RX ADMIN — Medication 30 MILLILITER(S): at 18:06

## 2023-03-01 RX ADMIN — Medication 324 MILLIGRAM(S): at 06:55

## 2023-03-01 RX ADMIN — MORPHINE SULFATE 2 MILLIGRAM(S): 50 CAPSULE, EXTENDED RELEASE ORAL at 18:06

## 2023-03-01 RX ADMIN — HEPARIN SODIUM 950 UNIT(S)/HR: 5000 INJECTION INTRAVENOUS; SUBCUTANEOUS at 08:58

## 2023-03-01 NOTE — ED ADULT NURSE NOTE - OBJECTIVE STATEMENT
Pt presents to ED c/o chest pain and nausea x2 days. As per pt, she was just seen at another hospital and d/c-ed yesterday.

## 2023-03-01 NOTE — CHART NOTE - NSCHARTNOTEFT_GEN_A_CORE
PRE-OP DIAGNOSIS:  NSTEMI      PROCEDURE:     [X] Coronary Angiogram     [X] LHC     [] LVG     [] RHC     [X] Intervention (see below)         PHYSICIAN:  Dr. Youssef    ASSISTANT:  Dr. Ruben Garay       PROCEDURE DESCRIPTION:     Consent:      [X] Patient     [] Family Member     []  Used        Anesthesia:     [X] General     [] Sedation     [X] Local        Access & Closure:     [X] 6 Fr R Radial Artery (TR band)    [] Fr Femoral Artery     [] Fr Femoral Vein     [] Fr Brachial Vein       IV Contrast: 120 mL        Intervention:   - IVUS  - PCI SWATHI to Prox LAD AUC 8      Implants: SYNERGY XD 4.0X12MM to Prox LAD       FINDINGS:     Coronary Dominance: Right dominate      LM: No significant disease.     LAD: 95 % Stenosis to Prox LAD s/p PCI. 50% stenosis to mid LAD    CX: No significant disease    RCA: Moderate diffuse disease     LVEDP: 18mmHg        ESTIMATED BLOOD LOSS: < 10 mL        CONDITION:     [X] Good     [] Fair     [] Critical        SPECIMEN REMOVED: N/A       POST-OP DIAGNOSIS:      [] Normal Coronary Angiogram     [] Mild Coronary Artery Disease (< 50% stenosis)     [X] 1 Vessel Coronary Artery Disease (LAD) s/p PCI       PLAN OF CARE:     [] D/C Home Today     [X] Return to In-patient bed     [] Admit for observation     [] Return for Staged Procedure     [] CT Surgery Consult     [] Medications: Aspirin, Brilinta, Atorvastatin and BB     [X] IV Fluids: NS 75cc/hr x 6hrs PRE-OP DIAGNOSIS:  NSTEMI      PROCEDURE:     [X] Coronary Angiogram     [X] LHC     [] LVG     [] RHC     [X] Intervention (see below)         PHYSICIAN:  Dr. Youssef    ASSISTANT:  Dr. Ruben Garay       PROCEDURE DESCRIPTION:     Consent:      [X] Patient     [] Family Member     []  Used        Anesthesia:     [X] General     [] Sedation     [X] Local        Access & Closure:     [X] 6 Fr R Radial Artery (TR band)    [] Fr Femoral Artery     [] Fr Femoral Vein     [] Fr Brachial Vein       IV Contrast: 120 mL        Intervention:   - IVUS  - PCI SWATHI to Prox LAD AUC 8      Implants: SYNERGY XD 4.0X12MM to Prox LAD       FINDINGS:     Coronary Dominance: Right dominate      LM: No significant disease.     LAD: 95 % Stenosis to Prox LAD s/p PCI. 50% stenosis to mid LAD    CX: No significant disease    RCA: Moderate diffuse disease     LVEDP: 18mmHg        ESTIMATED BLOOD LOSS: < 10 mL        CONDITION:     [X] Good     [] Fair     [] Critical        SPECIMEN REMOVED: N/A       POST-OP DIAGNOSIS:      [] Normal Coronary Angiogram     [] Mild Coronary Artery Disease (< 50% stenosis)     [X] 1 Vessel Coronary Artery Disease (LAD) s/p PCI       PLAN OF CARE:     [] D/C Home Today     [X] Return to In-patient bed     [] Admit for observation     [x] Return for Staged Procedure in 4-6 weeks    [] CT Surgery Consult     [] Medications: Aspirin, Brilinta, Atorvastatin and BB     [X] IV Fluids: NS 75cc/hr x 6hrs

## 2023-03-01 NOTE — H&P ADULT - NSHPLABSRESULTS_GEN_ALL_CORE
15.8   12.93 )-----------( 268      ( 28 Feb 2023 19:50 )             44.9       02-28    137  |  100  |  13  ----------------------------<  117<H>  4.7   |  25  |  0.8    Ca    10.0      28 Feb 2023 19:50  Mg     2.1     02-28    TPro  6.9  /  Alb  4.6  /  TBili  0.2  /  DBili  x   /  AST  15  /  ALT  9   /  AlkPhos  76  02-28                  PT/INR - ( 01 Mar 2023 06:30 )   PT: 10.80 sec;   INR: 0.95 ratio         PTT - ( 01 Mar 2023 06:30 )  PTT:28.2 sec    Lactate Trend      CARDIAC MARKERS ( 01 Mar 2023 06:30 )  x     / 0.42 ng/mL / x     / x     / x      CARDIAC MARKERS ( 28 Feb 2023 19:50 )  x     / 0.12 ng/mL / x     / x     / x            CAPILLARY BLOOD GLUCOSE

## 2023-03-01 NOTE — ED PROVIDER NOTE - NS ED ATTENDING STATEMENT MOD
Mg catheter removed. Pt tolerated well. 850ml urine emptied from mg bag. Will continue to monitor and prompt pt to use bathroom within 6-8 hour timeframe.    Electronically signed by Richard Adam RN on 2/21/2019 at 8:52 PM This was a shared visit with the CASEY. I reviewed and verified the documentation and independently performed the documented:
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
## 2023-03-01 NOTE — ED ADULT NURSE REASSESSMENT NOTE - NS ED NURSE REASSESS COMMENT FT1
Received report from prior RN. Pt A&Ox4, ambulatory baseline. Pt fall precautions maintained, BA in place. Pt on Tele/O2 monitor. No s/s of emergent distress noted. Comfort measures offered. Pt admitted to TELE, awaiting further orders, will f/u.

## 2023-03-01 NOTE — H&P ADULT - NSHPREVIEWOFSYSTEMS_GEN_ALL_CORE
REVIEW OF SYSTEMS:    CONSTITUTIONAL:  No weakness, fevers or chills  EYES/ENT:  No visual changes;  No vertigo or throat pain   NECK:  No pain or stiffness  RESPIRATORY:  No cough, wheezing, hemoptysis; No shortness of breath  CARDIOVASCULAR:  + chest pain   GASTROINTESTINAL:  No abdominal or epigastric pain. No nausea, vomiting, or hematemesis;  GENITOURINARY:  No dysuria, frequency or hematuria  MUSCULOSKELETAL:  FROM all extremities, normal strength, No calf tenderness  NEUROLOGICAL:  No numbness or weakness  SKIN:  No itching, rashes

## 2023-03-01 NOTE — CHART NOTE - NSCHARTNOTEFT_GEN_A_CORE
Called Brilinta prescription to pt's pharmacy ([facility]) - cost $477 co-pay per month.    Discussed with Dr. Youssef, sent Rx to [facility] for 30 days free supply, and then pt to be changed to Plavix 75 mg qd on follow-up appointment.

## 2023-03-01 NOTE — ED PROVIDER NOTE - CLINICAL SUMMARY MEDICAL DECISION MAKING FREE TEXT BOX
Labs and EKG were ordered and reviewed, where indicated.  Imaging was ordered and reviewed by me, where indicated.  Appropriate medications for patient's presenting complaints were ordered and effects were reassessed, where indicated.  Patient's records (prior hospital, ED visit, and/or nursing home note) were reviewed, if available.  Additional history was obtained from EMS, family, and/or PCP (where available).  Escalation to admission/observation was considered.  Patient requires inpatient hospitalization - monitored setting.     NSTEMI INITIALLY DX AT [facility] ED, PT LEFT AMA, CP RECURRED TONIGHT SO CAME NORTH - EKG NO CURRENT ALAINA BUT INFEROLAT TWI/ISCHEMIC, CARD CONSULTED REC MED TELE ADMISSION FOR LIKELY CATH LATER TODAY, REC HEPARIN IF REPEAT TN ELEVATED V 1ST - MAR AWARE AND WILL ORDER HEPARIN IF NECESSARY

## 2023-03-01 NOTE — CONSULT NOTE ADULT - SUBJECTIVE AND OBJECTIVE BOX
HISTORY OF PRESENT ILLNESS:   66yo F hx of HTN, chronic smoker presented with chest pain. Patient reports chest pain started this past monday. Patient reports chest pain is mid-sternal and burning sensation. Patient unclear if this is exertional. Patient went to [facility] ED yesterday and left after first set of trop 0.12. Patient noted on EKG with TWI in precordial leads. Patient came back via EMS because recurrent worsening chest pain. Noted associated SOB. Active pain.    PAST MEDICAL & SURGICAL HISTORY  Hypertension    History of cholecystectomy        FAMILY HISTORY:  FAMILY HISTORY:      SOCIAL HISTORY:  []smoker  []Alcohol  []Drug    ROS:  Negative except as mentioned in HPI    ALLERGIES:  penicillin (Hives)  Percodan (Hives)  Vibramycin (Hives; Rash)      MEDICATIONS:  MEDICATIONS  (STANDING):  aspirin  chewable 324 milliGRAM(s) Oral once  ondansetron Injectable 4 milliGRAM(s) IV Push once    MEDICATIONS  (PRN):      HOME MEDICATIONS:  Home Medications:  acetaminophen 650 mg oral tablet:  (18 Dec 2019 10:59)  metoprolol succinate 25 mg oral tablet, extended release: 1 tab(s) orally once a day (18 Dec 2019 10:59)      VITALS:   T(F): 98.8 (03-01 @ 05:24), Max: 98.8 (03-01 @ 05:24)  HR: 94 (03-01 @ 05:24) (74 - 94)  BP: 121/72 (03-01 @ 05:24) (121/72 - 145/78)  BP(mean): --  RR: 20 (03-01 @ 05:24) (18 - 20)  SpO2: 98% (03-01 @ 05:24) (98% - 98%)    I&O's Summary      PHYSICAL EXAM:  GEN: Not in acute distress  HEENT: NCAT, PERRL, EOMI  LUNGS: Clear to auscultation bilaterally   CARDIOVASCULAR: RRR, S1/S2 present, no murmurs, rubs or gallops, no JVD, + PP bilaterally  ABD: Soft, non-tender, non-distended  EXT: No ALBINO  SKIN: Intact  NEURO: AAOx3    LABS:                        15.8   12.93 )-----------( 268      ( 28 Feb 2023 19:50 )             44.9     02-28    137  |  100  |  13  ----------------------------<  117<H>  4.7   |  25  |  0.8    Ca    10.0      28 Feb 2023 19:50  Mg     2.1     02-28    TPro  6.9  /  Alb  4.6  /  TBili  0.2  /  DBili  x   /  AST  15  /  ALT  9   /  AlkPhos  76  02-28      Troponin T, Serum: 0.12 ng/mL *HH* (02-28-23 @ 19:50)    Troponin 0.12, CKMB --, CK --/ 02-28-23 @ 19:50    EKG: NSR, TWI    CXR: clear HISTORY OF PRESENT ILLNESS:   68yo F hx of HTN, chronic smoker presented with chest pain. Patient reports chest pain started this past monday. Patient reports chest pain is mid-sternal and burning sensation. Patient unclear if this is exertional. Patient went to [facility] ED yesterday and left after first set of trop 0.12. Patient noted on EKG with TWI in precordial leads. Patient came back via EMS because recurrent worsening chest pain. Noted associated SOB. Active pain.      PAST MEDICAL & SURGICAL HISTORY  Hypertension    History of cholecystectomy      Brother, 60s, PCI  Father, CABG  +current smoker    ALLERGIES:  penicillin (Hives)  Percodan (Hives)  Vibramycin (Hives; Rash)      MEDICATIONS:  MEDICATIONS  (STANDING):  aspirin  chewable 324 milliGRAM(s) Oral once  ondansetron Injectable 4 milliGRAM(s) IV Push once    Home Medications:  acetaminophen 650 mg oral tablet:  (18 Dec 2019 10:59)  metoprolol succinate 25 mg oral tablet, extended release: 1 tab(s) orally once a day (18 Dec 2019 10:59)      REVIEW OF SYSTEMS:  CONSTITUTIONAL: No fever, weight loss, fatigue  NECK: No pain or stiffness  RESPIRATORY: See HPI  CARDIOVASCULAR: See HPI  GASTROINTESTINAL: No abdominal/epigastric pain, nausea, vomiting, hematemesis, diarrhea, constipation, melena or hematochezia  GENITOURINARY: No dysuria, frequency, hematuria, incontinence  NEUROLOGICAL: No headaches, memory loss, loss of strength, numbness, tremors  SKIN: No itching, burning, rashes, lesions   ENDOCRINE: No heat/cold intolerance or hair loss  MUSCULOSKELETAL: No joint pain or swelling  HEME/LYMPH: No easy bruising or bleeding gums    VITALS:   T(F): 98.8 (03-01 @ 05:24), Max: 98.8 (03-01 @ 05:24)  HR: 94 (03-01 @ 05:24) (74 - 94)  BP: 121/72 (03-01 @ 05:24) (121/72 - 145/78)  BP(mean): --  RR: 20 (03-01 @ 05:24) (18 - 20)  SpO2: 98% (03-01 @ 05:24) (98% - 98%)        PHYSICAL EXAM:  GEN: Not in acute distress  HEENT: NCAT, EOMI  LUNGS: Clear to auscultation bilaterally   CARDIOVASCULAR: RRR, S1/S2+, no murmurs  ABD: Soft, non-tender, non-distended  EXT: No ALBINO  SKIN: Intact  NEURO: AAOx3      LABS:                        15.8   12.93 )-----------( 268      ( 28 Feb 2023 19:50 )             44.9     02-28    137  |  100  |  13  ----------------------------<  117<H>  4.7   |  25  |  0.8    Ca    10.0      28 Feb 2023 19:50  Mg     2.1     02-28    TPro  6.9  /  Alb  4.6  /  TBili  0.2  /  DBili  x   /  AST  15  /  ALT  9   /  AlkPhos  76  02-28      Troponin T, Serum: 0.12 ng/mL *HH* (02-28-23 @ 19:50)    EKG: NSR, TWI    CXR: clear

## 2023-03-01 NOTE — ED ADULT NURSE REASSESSMENT NOTE - NS ED NURSE REASSESS COMMENT FT1
Pt to go to [facility], report given to PJ Elam. Jewelry w/ daughter/. Belongings w/ pt. As per MD Olson, okay to be off Tele monitor to go to [facility].

## 2023-03-01 NOTE — CHART NOTE - NSCHARTNOTEFT_GEN_A_CORE
PREOPERATIVE DAY OF PROCEDURE EVALUATION:  I have personally seen and examined the patient.  I agree with the history and physical which I have reviewed and noted any changes below.     Bleeding Risk Score:    2.6%  -IVF pre-hydration: NS 250cc bolus (EF 34%, no clinical evidence of CHF, Cr 0.8)  -NSTEMI (Trop 0.12->0.42)  -on Heparin gtt, will hold now for cardiac cath   -received ASA 81mg PO x4       (Signed electronically by __________)  03-01-23 @ 15:40

## 2023-03-01 NOTE — CONSULT NOTE ADULT - ASSESSMENT
NSTEMI  CP    - Monitor on tele.  - Trend CE.  - Keep NPO.   - Load ASA.  - Heparin gtt ACS protocol.  - Check TTE.  - Will discuss plan for cath with attending cardiologist.

## 2023-03-01 NOTE — ED PROVIDER NOTE - PHYSICAL EXAMINATION
Physical Exam    Vital Signs: I have reviewed the initial vital signs.  Constitutional: well-nourished, appears stated age, no acute distress  Eyes: Conjunctiva pink, Sclera clear  Cardiovascular: S1 and S2, regular rate, regular rhythm, well-perfused extremities, radial pulses equal and 2+ b/l.   Respiratory: unlabored respiratory effort, clear to auscultation bilaterally no wheezing, rales and rhonchi. pt is speaking full sentences. no accessory muscle use.   Gastrointestinal: soft, non-tender, nondistended abdomen, no pulsatile mass, normal bowl sounds, no rebound, no guarding  Musculoskeletal: supple neck, no lower extremity edema, no calf tenderness, FROM of b/l upper and lower extremities.   Integumentary: warm, dry, no rash  Neurologic: awake, alert, extremities’ motor and sensory functions grossly intact. steady gait.   Psychiatric: appropriate mood, appropriate affect

## 2023-03-01 NOTE — ED PROVIDER NOTE - PROGRESS NOTE DETAILS
FF: cardio consulted, advised to put official cardio consult in computer. will see pt. FF: pt seen by cardio request rp cardiac enzyme, if even more elevated start heparin drip. can admit to med tele plan for cath today. I spoke with mar, made aware of plan. mar knows repeat labs were ordered and received, will follow.

## 2023-03-01 NOTE — H&P ADULT - ATTENDING COMMENTS
Patient seen and examined independently.   Daughter at bedside     PAST MEDICAL & SURGICAL HISTORY:  Hypertension  History of cholecystectomy          Vitals:  T(F): 98.6 (03-01-23 @ 07:42)  HR: 85 (03-01-23 @ 08:47)  BP: 109/63 (03-01-23 @ 08:47)  RR: 18 (03-01-23 @ 08:47)  SpO2: 100% (03-01-23 @ 08:47)    TESTS & MEASUREMENTS:                        15.8   12.93 )-----------( 268      ( 28 Feb 2023 19:50 )             44.9     PT/INR - ( 01 Mar 2023 06:30 )   PT: 10.80 sec;   INR: 0.95 ratio         PTT - ( 01 Mar 2023 06:30 )  PTT:28.2 sec  02-28    137  |  100  |  13  ----------------------------<  117<H>  4.7   |  25  |  0.8    Ca    10.0      28 Feb 2023 19:50  Mg     2.1     02-28    TPro  6.9  /  Alb  4.6  /  TBili  0.2  /  DBili  x   /  AST  15  /  ALT  9   /  AlkPhos  76  02-28    LIVER FUNCTIONS - ( 28 Feb 2023 19:50 )  Alb: 4.6 g/dL / Pro: 6.9 g/dL / ALK PHOS: 76 U/L / ALT: 9 U/L / AST: 15 U/L / GGT: x           CARDIAC MARKERS ( 01 Mar 2023 06:30 )  x     / 0.42 ng/mL / x     / x     / x      CARDIAC MARKERS ( 28 Feb 2023 19:50 )  x     / 0.12 ng/mL / x     / x     / x            In summary:  HEALTH ISSUES - PROBLEM Dx:    -- Chest pain, atypical yet with ischemic EKG changes and up-trending troponin; concerning for Patient seen and examined independently.   Daughter at bedside     PAST MEDICAL & SURGICAL HISTORY:  Hypertension  History of cholecystectomy          Vitals:  T(F): 98.6 (03-01-23 @ 07:42)  HR: 85 (03-01-23 @ 08:47)  BP: 109/63 (03-01-23 @ 08:47)  RR: 18 (03-01-23 @ 08:47)  SpO2: 100% (03-01-23 @ 08:47)    TESTS & MEASUREMENTS:                        15.8   12.93 )-----------( 268      ( 28 Feb 2023 19:50 )             44.9     PT/INR - ( 01 Mar 2023 06:30 )   PT: 10.80 sec;   INR: 0.95 ratio         PTT - ( 01 Mar 2023 06:30 )  PTT:28.2 sec  02-28    137  |  100  |  13  ----------------------------<  117<H>  4.7   |  25  |  0.8    Ca    10.0      28 Feb 2023 19:50  Mg     2.1     02-28    TPro  6.9  /  Alb  4.6  /  TBili  0.2  /  DBili  x   /  AST  15  /  ALT  9   /  AlkPhos  76  02-28    LIVER FUNCTIONS - ( 28 Feb 2023 19:50 )  Alb: 4.6 g/dL / Pro: 6.9 g/dL / ALK PHOS: 76 U/L / ALT: 9 U/L / AST: 15 U/L / GGT: x           CARDIAC MARKERS ( 01 Mar 2023 06:30 )  x     / 0.42 ng/mL / x     / x     / x      CARDIAC MARKERS ( 28 Feb 2023 19:50 )  x     / 0.12 ng/mL / x     / x     / x            In summary:  HEALTH ISSUES - PROBLEM Dx:    -- Chest pain, atypical yet with ischemic EKG changes and up-trending troponin; concerning for unstable angina /NSTEMI  -- HTN noted as PMH, currently with borderline values,   Rest of M/D/M as per detailed noted above     PLAN  .. cardiac telemonitoring   .. intravenous heparin drip as per cardiology team   .. antiplatelet medication and statins   ..TTE requested by cardiology   .. Plan is for  cardiac cath today     Patient is anxious about the procedure, I explained to the best of the knowledge and informed the patient and daughter at bedside that the interventional cardiology team will be providing detailed insight during the informed consent session.     Case discussed with resident assigned.

## 2023-03-01 NOTE — CONSULT NOTE ADULT - ATTENDING COMMENTS
Patient seen / examined and agree with plan above    NSTEMI / Unstable Angina    [facility] today - Dr. Youssef  TTE  NPO

## 2023-03-01 NOTE — H&P ADULT - HISTORY OF PRESENT ILLNESS
66 y/o female with a PMH of HTN and current daily smoker presents to the ED for evaluation of midsternal burning chest pain that began on monday afternoon associated with sweating. pt reports she woke up at 3AM on tuesday with the same burning pain and took peptobismol with relief. pt reports she woke up again at tuesday around 6pm with even worse burning chest pain and went to the [facility] ED via ambulance. pt had elevated troponin and eloped from the [facility] ED because she "does not trust them there." pt reports her father had an MI with quadruple bypass in his 60s, and her mother had an MI at the age of 60. pt denies fever, chills, cough, back pain, neck pain, dizziness, abdominal pain, n/v/d/c, illicit drug use, use of alcohol, sob, recent trauma, recent surgeries, recent surgeries, recent hospitalizations, leg pain, or leg swelling. 66 y/o female with a PMH of HTN and current daily smoker presents to the ED for evaluation of midsternal burning chest pain that began on monday afternoon associated with sweating. Patient reports 10/10 substernal non radiating chest pain for the past few days. It is not associated with excessive and is relieved with maalox. Patient reports pain is worse when laying down. Patient has midsternal tenderness to palpation. Pt reports she woke up at 3AM on tuesday with the same burning pain and took peptobismol with relief. pt reports she woke up again at tuesday around 6pm with even worse burning chest pain and went to the [facility] ED via ambulance. pt had elevated troponin and eloped from the [facility] ED because she "does not trust them there." pt reports her father had an MI with quadruple bypass in his 60s, and her mother had an MI at the age of 60. pt denies fever, chills, cough, back pain, calf pain, dizziness, abdominal pain, n/v/d/c, illicit drug use, use of alcohol, sob, recent trauma, recent surgeries, recent surgeries, recent hospitalizations, leg pain, or leg swelling.    In the ED patient had elevated troponin 0.42    · BP Systolic	121 mm Hg  · BP Diastolic	72 mm Hg  · Heart Rate	94 /min  · Respiration Rate (breaths/min)	20 /min  · Temp (F)	98.8 Degrees F  · Temp (C)	37.1 Degrees C  · Temp site	oral  · SpO2 (%)	98 %  · O2 Delivery/Oxygen Delivery Method	room air

## 2023-03-01 NOTE — H&P ADULT - ASSESSMENT
66 y/o female with a PMH of HTN and current daily smoker presents to the ED for evaluation of midsternal burning chest pain that began on monday afternoon associated with sweating. Patient reports chest pain is mid-sternal and burning sensation. Patient unclear if this is exertional.    # Chest pain  # NSTEMI    - Monitor on tele.  - Trend CE.  - Keep NPO.   - Load ASA.  - Heparin gtt ACS protocol.  - Check TTE.  - Will discuss plan for cath with attending cardiologist.     68 y/o female with a PMH of HTN and current daily smoker presents to the ED for evaluation of midsternal burning chest pain that began on monday afternoon associated with sweating. Patient reports chest pain is mid-sternal and burning sensation. Patient unclear if this is exertional.    # Chest pain  # NSTEMI vs musculoskeletal vs GERD  - Trop 0.42 continue to trend  - Monitor on tele.  - Keep NPO.   - Load ASA.  - Heparin gtt ACS protocol.  - Check TTE.  - Prepare for Cath with attending cardiologist.    #HTN  -C/w home metoprolol    DVT heparin  Diet NPO  Code full  Dispo: Pending Cath   66 y/o female with a PMH of HTN and current daily smoker presents to the ED for evaluation of midsternal burning chest pain that began on monday afternoon associated with sweating. Patient reports chest pain is mid-sternal and burning sensation. Patient unclear if this is exertional.    # Chest pain  # NSTEMI vs GERD  - Trop 0.42 continue to trend  - Monitor on tele.  - Keep NPO.   - Load ASA.  - Heparin gtt ACS protocol.  - Check TTE.  - Prepare for Cath with attending cardiologist.  - Started statin  - F/U AM TSH/lipids/A1C  - F/U EKG today    #HTN  - Switched home dose metoprolol succinate to metoprolol tartrate while admitted    DVT heparin  Diet NPO  Code full  Dispo: Pending Cath   66 y/o female with a PMH of HTN and current daily smoker presents to the ED for evaluation of midsternal burning chest pain that began on monday afternoon associated with sweating. Patient reports chest pain is mid-sternal and burning sensation. Patient unclear if this is exertional.    # Chest pain  # NSTEMI vs GERD  - Trop 0.42 continue to trend  - Monitor on tele.  - Keep NPO.   - Heparin gtt ACS protocol.  - Check TTE.  - Prepare for Cath with attending cardiologist.  - Started statin  - Loaded with ASA c/w baby ASA  - F/U AM TSH/lipids/A1C  - F/U EKG today    #HTN  - Switched home dose metoprolol succinate to metoprolol tartrate while admitted    DVT heparin  Diet NPO  Code full  Dispo: Pending Cath   66 y/o female with a PMH of HTN and current daily smoker presents to the ED for evaluation of midsternal burning chest pain that began on monday afternoon associated with sweating. Patient reports chest pain is mid-sternal and burning sensation. Patient unclear if this is exertional.    # Chest pain  # High suspicion of unstable angina or NSTEMI, less likely musculoskeletal or GERD  - Trop 0.42 continue to trend  - Monitor on tele.  - Keep NPO.   - C/w Heparin gtt ACS protocol.  - Prepare for Cath with attending cardiologist.  - Started statin  - Loaded with ASA c/w baby ASA  - F/U AM TSH/lipids/A1C  - F/U EKG today  - F/U TTE.    #H/o HTN currently borderline low bp  - Switched home dose metoprolol succinate to metoprolol tartrate while admitted    DVT heparin  Diet NPO  Code full  Dispo: Pending Cath

## 2023-03-01 NOTE — PATIENT PROFILE ADULT - FALL HARM RISK - HARM RISK INTERVENTIONS
Communicate Risk of Fall with Harm to all staff/Reinforce activity limits and safety measures with patient and family/Tailored Fall Risk Interventions/Use of alarms - bed, chair and/or voice tab/Visual Cue: Yellow wristband and red socks/Bed in lowest position, wheels locked, appropriate side rails in place/Call bell, personal items and telephone in reach/Instruct patient to call for assistance before getting out of bed or chair/Non-slip footwear when patient is out of bed/[place] to call system/Physically safe environment - no spills, clutter or unnecessary equipment/Purposeful Proactive Rounding/Room/bathroom lighting operational, light cord in reach

## 2023-03-01 NOTE — ED PROVIDER NOTE - OBJECTIVE STATEMENT
66 y/o female with a PMH of HTN and current daily smoker presents to the ED for evaluation of midsternal burning chest pain that began on monday afternoon associated with sweating. pt reports she woke up at 3AM on tuesday with the same burning pain and took peptobismol with relief. pt reports she woke up again at tuesday around 6pm with even worse burning chest pain and went to the [facility] ED via ambulance. pt had elevated troponin and eloped from the [facility] ED because she "does not trust them there." pt reports her father had an MI with quadruple bypass in his 60s, and her mother had an MI at the age of 60. pt denies fever, chills, cough, back pain, neck pain, dizziness, abdominal pain, n/v/d/c, illicit drug use, use of alcohol, sob, recent trauma, recent surgeries, recent surgeries, recent hospitalizations, leg pain, or leg swelling.

## 2023-03-01 NOTE — ED ADULT TRIAGE NOTE - CHIEF COMPLAINT QUOTE
" I have chest pains and nausea x 2 days." [place] " I have chest pains and nausea x 2 days." Seen in [facility] but left

## 2023-03-01 NOTE — H&P ADULT - NSHPPHYSICALEXAM_GEN_ALL_CORE
T(C): 37 (03-01-23 @ 07:42), Max: 37.1 (03-01-23 @ 05:24)  HR: 68 (03-01-23 @ 07:42) (68 - 94)  BP: 100/50 (03-01-23 @ 07:42) (95/50 - 145/78)  RR: 18 (03-01-23 @ 07:42) (18 - 20)  SpO2: 100% (03-01-23 @ 07:42) (98% - 100%)    PHYSICAL EXAM:  GENERAL: NAD, well-developed  HEAD:  Atraumatic, Normocephalic  EYES: EOMI, PERRLA, conjunctiva and sclera clear  ENT:No nasal obstruction or discharge. No tonsillar exudate, swelling or erythema.  NECK: Supple, No JVD  CHEST/LUNG: Clear to auscultation bilaterally; No wheeze  HEART: Regular rate and rhythm; No murmurs, rubs, or gallops  ABDOMEN: Soft, Nontender, Nondistended; Bowel sounds present  EXTREMITIES:  2+ Peripheral Pulses, No clubbing, cyanosis, or edema  PSYCH: AAOx3  NEUROLOGY: non-focal  SKIN: No rashes or lesions T(C): 37 (03-01-23 @ 07:42), Max: 37.1 (03-01-23 @ 05:24)  HR: 68 (03-01-23 @ 07:42) (68 - 94)  BP: 100/50 (03-01-23 @ 07:42) (95/50 - 145/78)  RR: 18 (03-01-23 @ 07:42) (18 - 20)  SpO2: 100% (03-01-23 @ 07:42) (98% - 100%)    PHYSICAL EXAM:  GENERAL: NAD, well-developed  HEAD:  Atraumatic, Normocephalic  EYES: conjunctiva and sclera clear  ENT: No nasal obstruction or discharge.  NECK: Supple,   CHEST/LUNG: Clear to auscultation bilaterally; No wheeze  HEART: Regular rate and rhythm   ABDOMEN: Soft, Nontender, Nondistended  EXTREMITIES: No clubbing, cyanosis, or edema  PSYCH: AAOx3  NEUROLOGY: non-focal  SKIN: No rashes or lesions

## 2023-03-02 ENCOUNTER — TRANSCRIPTION ENCOUNTER (OUTPATIENT)
Age: 68
End: 2023-03-02

## 2023-03-02 VITALS — HEART RATE: 86 BPM | DIASTOLIC BLOOD PRESSURE: 61 MMHG | SYSTOLIC BLOOD PRESSURE: 107 MMHG

## 2023-03-02 LAB
A1C WITH ESTIMATED AVERAGE GLUCOSE RESULT: 5.7 % — HIGH (ref 4–5.6)
ALBUMIN SERPL ELPH-MCNC: 4.2 G/DL — SIGNIFICANT CHANGE UP (ref 3.5–5.2)
ALP SERPL-CCNC: 74 U/L — SIGNIFICANT CHANGE UP (ref 30–115)
ALT FLD-CCNC: 10 U/L — SIGNIFICANT CHANGE UP (ref 0–41)
ANION GAP SERPL CALC-SCNC: 11 MMOL/L — SIGNIFICANT CHANGE UP (ref 7–14)
AST SERPL-CCNC: 23 U/L — SIGNIFICANT CHANGE UP (ref 0–41)
BASOPHILS # BLD AUTO: 0.07 K/UL — SIGNIFICANT CHANGE UP (ref 0–0.2)
BASOPHILS NFR BLD AUTO: 0.7 % — SIGNIFICANT CHANGE UP (ref 0–1)
BILIRUB SERPL-MCNC: 0.3 MG/DL — SIGNIFICANT CHANGE UP (ref 0.2–1.2)
BUN SERPL-MCNC: 14 MG/DL — SIGNIFICANT CHANGE UP (ref 10–20)
CALCIUM SERPL-MCNC: 9.7 MG/DL — SIGNIFICANT CHANGE UP (ref 8.4–10.4)
CHLORIDE SERPL-SCNC: 101 MMOL/L — SIGNIFICANT CHANGE UP (ref 98–110)
CHOLEST SERPL-MCNC: 157 MG/DL — SIGNIFICANT CHANGE UP
CO2 SERPL-SCNC: 24 MMOL/L — SIGNIFICANT CHANGE UP (ref 17–32)
CREAT SERPL-MCNC: 0.8 MG/DL — SIGNIFICANT CHANGE UP (ref 0.7–1.5)
EGFR: 82 ML/MIN/1.73M2 — SIGNIFICANT CHANGE UP
EOSINOPHIL # BLD AUTO: 0.12 K/UL — SIGNIFICANT CHANGE UP (ref 0–0.7)
EOSINOPHIL NFR BLD AUTO: 1.2 % — SIGNIFICANT CHANGE UP (ref 0–8)
ESTIMATED AVERAGE GLUCOSE: 117 MG/DL — HIGH (ref 68–114)
GLUCOSE BLDC GLUCOMTR-MCNC: 106 MG/DL — HIGH (ref 70–99)
GLUCOSE BLDC GLUCOMTR-MCNC: 132 MG/DL — HIGH (ref 70–99)
GLUCOSE SERPL-MCNC: 116 MG/DL — HIGH (ref 70–99)
HCT VFR BLD CALC: 41.5 % — SIGNIFICANT CHANGE UP (ref 37–47)
HCV AB S/CO SERPL IA: 0.04 COI — SIGNIFICANT CHANGE UP
HCV AB SERPL-IMP: SIGNIFICANT CHANGE UP
HDLC SERPL-MCNC: 39 MG/DL — LOW
HGB BLD-MCNC: 14.3 G/DL — SIGNIFICANT CHANGE UP (ref 12–16)
IMM GRANULOCYTES NFR BLD AUTO: 0.5 % — HIGH (ref 0.1–0.3)
LIPID PNL WITH DIRECT LDL SERPL: 66 MG/DL — SIGNIFICANT CHANGE UP
LYMPHOCYTES # BLD AUTO: 2.48 K/UL — SIGNIFICANT CHANGE UP (ref 1.2–3.4)
LYMPHOCYTES # BLD AUTO: 24.8 % — SIGNIFICANT CHANGE UP (ref 20.5–51.1)
MAGNESIUM SERPL-MCNC: 2 MG/DL — SIGNIFICANT CHANGE UP (ref 1.8–2.4)
MCHC RBC-ENTMCNC: 33.3 PG — HIGH (ref 27–31)
MCHC RBC-ENTMCNC: 34.5 G/DL — SIGNIFICANT CHANGE UP (ref 32–37)
MCV RBC AUTO: 96.7 FL — SIGNIFICANT CHANGE UP (ref 81–99)
MONOCYTES # BLD AUTO: 0.81 K/UL — HIGH (ref 0.1–0.6)
MONOCYTES NFR BLD AUTO: 8.1 % — SIGNIFICANT CHANGE UP (ref 1.7–9.3)
NEUTROPHILS # BLD AUTO: 6.49 K/UL — SIGNIFICANT CHANGE UP (ref 1.4–6.5)
NEUTROPHILS NFR BLD AUTO: 64.7 % — SIGNIFICANT CHANGE UP (ref 42.2–75.2)
NON HDL CHOLESTEROL: 118 MG/DL — SIGNIFICANT CHANGE UP
NRBC # BLD: 0 /100 WBCS — SIGNIFICANT CHANGE UP (ref 0–0)
PLATELET # BLD AUTO: 240 K/UL — SIGNIFICANT CHANGE UP (ref 130–400)
POTASSIUM SERPL-MCNC: 4.5 MMOL/L — SIGNIFICANT CHANGE UP (ref 3.5–5)
POTASSIUM SERPL-SCNC: 4.5 MMOL/L — SIGNIFICANT CHANGE UP (ref 3.5–5)
PROT SERPL-MCNC: 6.3 G/DL — SIGNIFICANT CHANGE UP (ref 6–8)
RBC # BLD: 4.29 M/UL — SIGNIFICANT CHANGE UP (ref 4.2–5.4)
RBC # FLD: 12.1 % — SIGNIFICANT CHANGE UP (ref 11.5–14.5)
SODIUM SERPL-SCNC: 136 MMOL/L — SIGNIFICANT CHANGE UP (ref 135–146)
TRIGL SERPL-MCNC: 263 MG/DL — HIGH
TROPONIN T SERPL-MCNC: 0.56 NG/ML — CRITICAL HIGH
TSH SERPL-MCNC: 2.01 UIU/ML — SIGNIFICANT CHANGE UP (ref 0.27–4.2)
WBC # BLD: 10.02 K/UL — SIGNIFICANT CHANGE UP (ref 4.8–10.8)
WBC # FLD AUTO: 10.02 K/UL — SIGNIFICANT CHANGE UP (ref 4.8–10.8)

## 2023-03-02 PROCEDURE — 93010 ELECTROCARDIOGRAM REPORT: CPT

## 2023-03-02 PROCEDURE — 99239 HOSP IP/OBS DSCHRG MGMT >30: CPT

## 2023-03-02 RX ORDER — METOPROLOL TARTRATE 50 MG
1 TABLET ORAL
Qty: 60 | Refills: 2
Start: 2023-03-02 | End: 2023-05-30

## 2023-03-02 RX ORDER — ACETAMINOPHEN 500 MG
0 TABLET ORAL
Qty: 0 | Refills: 0 | DISCHARGE

## 2023-03-02 RX ORDER — METOPROLOL TARTRATE 50 MG
1 TABLET ORAL
Qty: 0 | Refills: 0 | DISCHARGE

## 2023-03-02 RX ORDER — METOPROLOL TARTRATE 50 MG
12.5 TABLET ORAL ONCE
Refills: 0 | Status: COMPLETED | OUTPATIENT
Start: 2023-03-02 | End: 2023-03-02

## 2023-03-02 RX ORDER — LISINOPRIL 2.5 MG/1
2.5 TABLET ORAL DAILY
Refills: 0 | Status: DISCONTINUED | OUTPATIENT
Start: 2023-03-02 | End: 2023-03-02

## 2023-03-02 RX ORDER — METOPROLOL TARTRATE 50 MG
25 TABLET ORAL
Refills: 0 | Status: DISCONTINUED | OUTPATIENT
Start: 2023-03-02 | End: 2023-03-02

## 2023-03-02 RX ORDER — LISINOPRIL 2.5 MG/1
1 TABLET ORAL
Qty: 30 | Refills: 2
Start: 2023-03-02 | End: 2023-05-30

## 2023-03-02 RX ORDER — ATORVASTATIN CALCIUM 80 MG/1
1 TABLET, FILM COATED ORAL
Qty: 30 | Refills: 2
Start: 2023-03-02 | End: 2023-05-30

## 2023-03-02 RX ORDER — ASPIRIN/CALCIUM CARB/MAGNESIUM 324 MG
1 TABLET ORAL
Qty: 30 | Refills: 2
Start: 2023-03-02 | End: 2023-07-08

## 2023-03-02 RX ORDER — ASPIRIN/CALCIUM CARB/MAGNESIUM 324 MG
1 TABLET ORAL
Qty: 30 | Refills: 2
Start: 2023-03-02 | End: 2023-05-30

## 2023-03-02 RX ADMIN — Medication 12.5 MILLIGRAM(S): at 05:08

## 2023-03-02 RX ADMIN — Medication 12.5 MILLIGRAM(S): at 09:59

## 2023-03-02 RX ADMIN — LISINOPRIL 2.5 MILLIGRAM(S): 2.5 TABLET ORAL at 14:22

## 2023-03-02 RX ADMIN — TICAGRELOR 90 MILLIGRAM(S): 90 TABLET ORAL at 05:08

## 2023-03-02 RX ADMIN — Medication 81 MILLIGRAM(S): at 12:18

## 2023-03-02 NOTE — DISCHARGE NOTE PROVIDER - NSDCQMERRANDS_GEN_ALL_CORE
Subjective   History of Present Illness  The patient presents wanting a COVID test.  He had some nausea vomiting at work.  They wanted to get tested.  He states that he has good acid reflux.  Sometimes when he drinks too much he has some nausea vomiting.  The patient denies any fever chills.  Denies any other significant symptoms.        Review of Systems   Constitutional: Negative.    HENT: Negative.  Negative for sinus pressure and sore throat.    Eyes: Negative.    Respiratory: Negative.  Negative for cough and shortness of breath.    Cardiovascular: Negative.  Negative for chest pain.   Gastrointestinal: Negative for abdominal pain, diarrhea, nausea and vomiting.   Endocrine: Negative.    Genitourinary: Negative.    Musculoskeletal: Negative.    Skin: Negative.    Allergic/Immunologic: Negative.    Neurological: Negative.  Negative for syncope, numbness and headaches.   Hematological: Negative.    Psychiatric/Behavioral: Negative.  Negative for confusion.   All other systems reviewed and are negative.      History reviewed. No pertinent past medical history.    No Known Allergies    History reviewed. No pertinent surgical history.    History reviewed. No pertinent family history.    Social History     Socioeconomic History   • Marital status: Single   Tobacco Use   • Smoking status: Never   Substance and Sexual Activity   • Alcohol use: Yes     Comment: EVERY NIGHT   • Drug use: Never   • Sexual activity: Not Currently           Objective   Physical Exam  Vitals and nursing note reviewed.   Constitutional:       Appearance: Normal appearance. He is normal weight.   HENT:      Head: Normocephalic and atraumatic.      Right Ear: External ear normal.      Left Ear: External ear normal.      Nose: Nose normal.      Mouth/Throat:      Mouth: Mucous membranes are moist.      Pharynx: Oropharynx is clear.   Eyes:      Extraocular Movements: Extraocular movements intact.      Conjunctiva/sclera: Conjunctivae normal.       Pupils: Pupils are equal, round, and reactive to light.   Cardiovascular:      Rate and Rhythm: Normal rate and regular rhythm.      Pulses: Normal pulses.      Heart sounds: Normal heart sounds.   Pulmonary:      Effort: Pulmonary effort is normal.      Breath sounds: Normal breath sounds.   Abdominal:      General: Abdomen is flat.      Palpations: Abdomen is soft.      Tenderness: There is no abdominal tenderness.   Musculoskeletal:         General: Normal range of motion.      Cervical back: Normal range of motion and neck supple. No rigidity.   Skin:     General: Skin is warm and dry.   Neurological:      General: No focal deficit present.      Mental Status: He is alert and oriented to person, place, and time. Mental status is at baseline.      Cranial Nerves: No cranial nerve deficit.      Sensory: No sensory deficit.      Motor: No weakness.      Gait: Gait normal.   Psychiatric:         Mood and Affect: Mood normal.         Behavior: Behavior normal.         Thought Content: Thought content normal.         Judgment: Judgment normal.         Procedures           ED Course  ED Course as of 12/05/22 1637   Mon Dec 05, 2022   1632 COVID-19 and FLU A/B PCR - Swab, Nasopharynx  neg [CT]      ED Course User Index  [CT] Harris Rios MD                                           [facility]  Number of Diagnoses or Management Options  Diagnosis management comments: Negative COVID test.  The patient is stable for release.  The patient's complaints are not minimal.  The patient once again wanted to be cleared for work.       Amount and/or Complexity of Data Reviewed  Clinical lab tests: reviewed        Final diagnoses:   Encounter for medical screening examination   Gastroesophageal reflux disease, unspecified whether esophagitis present       ED Disposition  ED Disposition     ED Disposition   Discharge    Condition   Stable    Comment   --             Kari Joyce MD  [address] [facility] [address]  [place]  [id number] [phone]    Schedule an appointment as soon as possible for a visit   If symptoms worsen, as needed         Medication List      No changes were made to your prescriptions during this visit.        No

## 2023-03-02 NOTE — DISCHARGE NOTE NURSING/CASE MANAGEMENT/SOCIAL WORK - NSDCPEFALRISK_GEN_ALL_CORE
For information on Fall & Injury Prevention, visit: [email or web address] OR  [email or web address] OR  https://www.cdc.gov/steadi/patient.html

## 2023-03-02 NOTE — DISCHARGE NOTE NURSING/CASE MANAGEMENT/SOCIAL WORK - NSDCFUADDAPPT_GEN_ALL_CORE_FT
APPTS ARE READY TO BE MADE: [x] YES    Best Family or Patient Contact (if needed):  [phone]  Additional Information about above appointments (if needed):    1: Cardiology with Dr. Joanne Caal  2:   3:     Other comments or requests:

## 2023-03-02 NOTE — MEDICAL STUDENT PROGRESS NOTE(EDUCATION) - SUBJECTIVE AND OBJECTIVE BOX
ANALILIADENISE  67y  Female      Patient is a 67y old  Female who presents with a chief complaint of chest pain (02 Mar 2023 06:48)      INTERVAL HPI/OVERNIGHT EVENTS:  Patient examined at bedside resting comfortably. Patient states that she is doing well and had no overnight events. Denies any CP, palpitations, SOB, n/v/d and any other associated symptoms.     REVIEW OF SYSTEMS:  CONSTITUTIONAL: No fever, weight loss, or fatigue  EYES: No eye pain, visual disturbances, or discharge  ENMT:  No difficulty hearing, tinnitus, vertigo; No sinus or throat pain  NECK: No pain or stiffness  RESPIRATORY: No cough, wheezing, chills or hemoptysis; No shortness of breath  CARDIOVASCULAR: No chest pain, palpitations, dizziness, or leg swelling  GASTROINTESTINAL: No abdominal or epigastric pain. No nausea, vomiting, or hematemesis; No diarrhea or constipation. No melena or hematochezia.  GENITOURINARY: No dysuria, frequency, hematuria, or incontinence  NEUROLOGICAL: No headaches, memory loss, loss of strength, numbness, or tremors  SKIN: No itching, burning, rashes, or lesions   LYMPH NODES: No enlarged glands  ENDOCRINE: No heat or cold intolerance; No hair loss  MUSCULOSKELETAL: No joint pain or swelling; No muscle, back, or extremity pain  PSYCHIATRIC: No depression, anxiety, mood swings, or difficulty sleeping  HEME/LYMPH: No easy bruising, or bleeding gums  ALLERGY AND IMMUNOLOGIC: No hives or eczema    MEDICATIONS   Standing  aspirin  chewable 81 milliGRAM(s) Oral daily  atorvastatin 80 milliGRAM(s) Oral at bedtime  influenza  Vaccine (HIGH DOSE) 0.7 milliLiter(s) IntraMuscular once  metoprolol tartrate 12.5 milliGRAM(s) Oral once  metoprolol tartrate 25 milliGRAM(s) Oral two times a day  sodium chloride 0.9%. 1000 milliLiter(s) (150 mL/Hr) IV Continuous <Continuous>  ticagrelor 90 milliGRAM(s) Oral every 12 hours    MEDICATIONS   PRN  acetaminophen     Tablet .. 650 milliGRAM(s) Oral every 6 hours PRN Temp greater or equal to 38C (100.4F), Mild Pain (1 - 3)  aluminum hydroxide/magnesium hydroxide/simethicone Suspension 30 milliLiter(s) Oral every 4 hours PRN Dyspepsia  melatonin 3 milliGRAM(s) Oral at bedtime PRN Insomnia  ondansetron Injectable 4 milliGRAM(s) IV Push every 8 hours PRN Nausea and/or Vomiting    VITALS:  T(C): 35.3 (03-02-23 @ 04:54), Max: 36.1 (03-01-23 @ 20:29)  T(F): 95.6 (02 Mar 2023 04:54), Max: 97 (01 Mar 2023 20:29)  HR: 90 (03-02-23 @ 04:54) (72 - 90)  BP: 123/64 (03-02-23 @ 04:54) (112/63 - 123/64)  RR: 18 (03-02-23 @ 04:54) (18 - 18)  SpO2: 100% (03-01-23 @ 20:29) (100% - 100%)    Parameters below as of 01 Mar 2023 20:29  Patient On (Oxygen Delivery Method): room air    ALLERGIES:  penicillin (Hives)  Percodan (Hives)  Vibramycin (Hives; Rash)      PHYSICAL EXAM:  GENERAL: NAD, well-groomed, well-developed  HEAD:  Atraumatic, Normocephalic  EYES: EOMI, PERRLA, conjunctiva and sclera clear  ENMT: Moist mucous membranes, Good dentition, No lesions  NECK: Supple, No JVD, Normal thyroid  NERVOUS SYSTEM:  Alert & Oriented X3, Good concentration  CHEST/LUNG: Clear to percussion bilaterally; No rales, rhonchi, wheezing, or rubs  HEART: Regular rate and rhythm; No murmurs, rubs, or gallops  ABDOMEN: Soft, Nontender, Nondistended; Bowel sounds present  EXTREMITIES: No clubbing, cyanosis, or edema    Consultant(s) Notes Reviewed:  [x ] YES  [ ] NO  Care Discussed with Consultants/Other Providers [ x] YES  [ ] NO      LABS:                        14.3   10.02 )-----------( 240      ( 02 Mar 2023 06:25 )             41.5     03-02    136  |  101  |  14  ----------------------------<  116<H>  4.5   |  24  |  0.8    Ca    9.7      02 Mar 2023 06:25  Mg     2.0     03-02    TPro  6.3  /  Alb  4.2  /  TBili  0.3  /  DBili  x   /  AST  23  /  ALT  10  /  AlkPhos  74  03-02    LIVER FUNCTIONS - ( 02 Mar 2023 06:25 )  Alb: 4.2 g/dL / Pro: 6.3 g/dL / ALK PHOS: 74 U/L / ALT: 10 U/L / AST: 23 U/L / GGT: x           PT/INR - ( 01 Mar 2023 06:30 )   PT: 10.80 sec;   INR: 0.95 ratio       PTT - ( 01 Mar 2023 14:52 )  PTT:41.1 sec      RADIOLOGY & ADDITIONAL TESTS:    Imaging Personally Reviewed:  [ ] YES  [ ] NO    TTE Echo Complete w/ Contrast w/ Doppler (03.01.23 @ 12:43) >  Summary:   1. Moderately decreased global left ventricular systolic function.   2. Multiple left ventricular regional wall motion abnormalities exist.   See wall motion findings.   3. LV Ejection Fraction by Paul's Method with a biplane EF of 34 %.   4. Spectral Doppler shows impaired relaxation pattern of left   ventricular myocardial filling (Grade I diastolic dysfunction).   5. Normal left atrial size.   6. Normal right atrial size.   7. Trace mitral valve regurgitation.    ASSESSMENT/PLAN   68 y/o female with a PMH of HTN and current daily smoker presents to the ED for evaluation of midsternal burning chest pain that began on monday afternoon associated with sweating. Patient reports chest pain is mid-sternal and burning sensation. Patient unclear if this is exertional.    # Chest pain  # NSTEMI  - Trop 0.42 -->0.40 --> 0.56  - Monitor on tele.  - C/w Heparin gtt ACS protocol.  - Started statin  - Loaded with ASA c/w baby ASA  - F/U with cardio, will start her on lisinopril    -Cardiac Cath  Coronary Dominance: Right dominate  LM: No significant disease.   LAD: 95 % Stenosis to Prox LAD s/p PCI. 50% stenosis to mid LAD  CX: No significant disease  RCA: Moderate diffuse disease  LVEDP: 18mmHg     Called Brilinta prescription to pt's pharmacy ([facility]) - cost $477 co-pay per month.  Discussed with Dr. Youssef, sent Rx to [facility] for 30 days free supply, and then pt to be changed to Plavix 75 mg qd on follow-up appointment.    #H/o HTN currently borderline low bp  - Switched home dose metoprolol succinate to metoprolol tartrate while admitted  - Increased metoprolol     DVT heparin  Diet NPO  Code full  Dispo:

## 2023-03-02 NOTE — DISCHARGE NOTE PROVIDER - NSDCFUSCHEDAPPT_GEN_ALL_CORE_FT
[facility]  ONCPAINMGT [id number]  Scheduled Appointment: 03/21/2023    Reyes Craven  [facility]  ONCORTHO [address]  Scheduled Appointment: 04/25/2023

## 2023-03-02 NOTE — DISCHARGE NOTE PROVIDER - CARE PROVIDER_API CALL
Joanne Caal)  Cardiovascular Disease; Internal Medicine  [address] [place] [address]  Phone: [phone]  Fax: [phone]  Follow Up Time: 2 weeks

## 2023-03-02 NOTE — PROGRESS NOTE ADULT - ASSESSMENT
68 y/o female with a PMH of HTN and current daily smoker presents to the ED for evaluation of midsternal burning chest pain that began on monday afternoon associated with sweating. Patient reports chest pain is mid-sternal and burning sensation. Patient unclear if this is exertional.    # Chest pain  # NSTEMI  - Trop 0.42 >0.40  - Monitor on tele.  - Keep NPO.   - C/w Heparin gtt ACS protocol.  - Started statin  - Loaded with ASA c/w baby ASA  - F/U AM TSH/lipids/A1C    -Cardiac Cath  Coronary Dominance: Right dominate  LM: No significant disease.   LAD: 95 % Stenosis to Prox LAD s/p PCI. 50% stenosis to mid LAD  CX: No significant disease  RCA: Moderate diffuse disease  LVEDP: 18mmHg     Called Brilinta prescription to pt's pharmacy ([facility]) - cost $477 co-pay per month.  Discussed with Dr. Youssef, sent Rx to [facility] for 30 days free supply, and then pt to be changed to Plavix 75 mg qd on follow-up appointment.    #H/o HTN currently borderline low bp  - Switched home dose metoprolol succinate to metoprolol tartrate while admitted    DVT heparin  Diet NPO  Code full  Dispo: 66 y/o female with a PMH of HTN and current daily smoker presents to the ED for evaluation of midsternal burning chest pain that began on monday afternoon associated with sweating. Patient reports chest pain is mid-sternal and burning sensation. Patient unclear if this is exertional.    # Chest pain  # NSTEMI  - Trop 0.42 >0.40 > 0.56 (after cath)  - Monitor on tele.  - TTE: EF 34%, G1DD, moderately decreased global LV systolic function. Multiple LV regional wall motion abnormalities.  - C/w Lipitor 80mg qd  - C/w ASA, ticagrelor  - Increase metoprolol tartrate to 25mg BID  - F/U TSH/A1C  - Lipid panel: , Total cholesterol 157, LDL 66, HDL 39    -Cardiac Cath 3/1/23:  Coronary Dominance: Right dominate  LM: No significant disease.   LAD: 95 % Stenosis to Prox LAD s/p PCI. 50% stenosis to mid LAD  CX: No significant disease  RCA: Moderate diffuse disease  LVEDP: 18mmHg     - Brilinta prescription to pt's pharmacy ([facility]) - cost $477 co-pay per month.    - Discussed with Dr. Youssef, sent Rx to [facility] pharmacy for 30 days free supply, and then pt to be changed to Plavix 75 mg qd on follow-up appointment.    #H/o HTN currently borderline low bp  - Switched home dose metoprolol succinate to metoprolol tartrate while admitted    #Other  DVT ppx: Ambulating around unit  GI ppx: none  Diet: DASH/TLC  Code: full  Dispo: to home with cardio f/u

## 2023-03-02 NOTE — DISCHARGE NOTE PROVIDER - NSDCMRMEDTOKEN_GEN_ALL_CORE_FT
acetaminophen 650 mg oral tablet:   Brilinta (ticagrelor) 90 mg oral tablet: 1 tab(s) orally 2 times a day MDD:2  metoprolol succinate 25 mg oral tablet, extended release: 1 tab(s) orally once a day   aspirin 81 mg oral tablet, chewable: 1 tab(s) orally once a day  atorvastatin 80 mg oral tablet: 1 tab(s) orally once a day (at bedtime)  Brilinta (ticagrelor) 90 mg oral tablet: 1 tab(s) orally 2 times a day MDD:2  lisinopril 2.5 mg oral tablet: 1 tab(s) orally once a day  metoprolol tartrate 25 mg oral tablet: 1 tab(s) orally 2 times a day

## 2023-03-02 NOTE — DISCHARGE NOTE NURSING/CASE MANAGEMENT/SOCIAL WORK - PATIENT PORTAL LINK FT
You can access the FollowMyHealth Patient Portal offered by [facility] by registering at the following website: [email or web address]. By joining [facility]’s FollowMyHealth portal, you will also be able to view your health information using other applications (apps) compatible with our system.

## 2023-03-02 NOTE — PROGRESS NOTE ADULT - SUBJECTIVE AND OBJECTIVE BOX
DENISE BILLINGSLEY 67y Female  MRN#: [id number]  Hospital Day: 1d    Pt is currently admitted with the primary diagnosis of chest pain    68 y/o female with a PMH of HTN and current daily smoker presents to the ED for evaluation of midsternal burning chest pain that began on monday afternoon associated with sweating. Patient reports 10/10 substernal non radiating chest pain for the past few days. It is not associated with excessive and is relieved with maalox. Patient reports pain is worse when laying down. Patient has midsternal tenderness to palpation. Pt reports she woke up at 3AM on tuesday with the same burning pain and took peptobismol with relief. pt reports she woke up again at tuesday around 6pm with even worse burning chest pain and went to the [facility] ED via ambulance. pt had elevated troponin and eloped from the [facility] ED because she "does not trust them there." pt reports her father had an MI with quadruple bypass in his 60s, and her mother had an MI at the age of 60. pt denies fever, chills, cough, back pain, calf pain, dizziness, abdominal pain, n/v/d/c, illicit drug use, use of alcohol, sob, recent trauma, recent surgeries, recent surgeries, recent hospitalizations, leg pain, or leg swelling.    In the ED patient had elevated troponin 0.42    SUBJECTIVE  Overnight events:  Subjective complaints:    Present Today:   - Avilez:  No [  ], Yes [   ] : Indication:     - Type of IV Access:       .. CVC/Piccline:  No [  ], Yes [   ] : Indication:       .. Midline: No [  ], Yes [   ] : Indication:                                             ----------------------------------------------------------  OBJECTIVE    VITAL SIGNS: Last 24 Hours  T(C): 35.3 (02 Mar 2023 04:54), Max: 37 (01 Mar 2023 07:42)  T(F): 95.6 (02 Mar 2023 04:54), Max: 98.6 (01 Mar 2023 07:42)  HR: 90 (02 Mar 2023 04:54) (68 - 90)  BP: 123/64 (02 Mar 2023 04:54) (95/50 - 123/64)  BP(mean): --  RR: 18 (02 Mar 2023 04:54) (18 - 18)  SpO2: 100% (01 Mar 2023 20:29) (100% - 100%)        PHYSICAL EXAM:  General: well-appearing in NAD.  HEENT: Normocephalic, nontraumatic. PERRL.  LUNGS: Clear to auscultation b/l. No wheezes, rales, or rhonchi.  HEART: RRR. No murmurs, rubs, or gallops.  ABDOMEN: Soft, nontender, nondistended. + bowel sounds.  EXT: Pulses palpable x 4. No lower extremity edema.  NEURO: AAOx4.  SKIN: Warm, dry.    PAST MEDICAL & SURGICAL HISTORY  Hypertension  History of cholecystectomy                                              -----------------------------------------------------------  ALLERGIES:  penicillin (Hives)  Percodan (Hives)  Vibramycin (Hives; Rash)                                            ------------------------------------------------------------    HOME MEDICATIONS  Home Medications:  acetaminophen 650 mg oral tablet:  (18 Dec 2019 10:59)  metoprolol succinate 25 mg oral tablet, extended release: 1 tab(s) orally once a day (18 Dec 2019 10:59)                           MEDICATIONS:  STANDING MEDICATIONS  aspirin  chewable 81 milliGRAM(s) Oral daily  atorvastatin 80 milliGRAM(s) Oral at bedtime  influenza  Vaccine (HIGH DOSE) 0.7 milliLiter(s) IntraMuscular once  metoprolol tartrate 12.5 milliGRAM(s) Oral every 12 hours  sodium chloride 0.9%. 1000 milliLiter(s) IV Continuous <Continuous>  ticagrelor 90 milliGRAM(s) Oral every 12 hours    PRN MEDICATIONS  acetaminophen     Tablet .. 650 milliGRAM(s) Oral every 6 hours PRN  aluminum hydroxide/magnesium hydroxide/simethicone Suspension 30 milliLiter(s) Oral every 4 hours PRN  melatonin 3 milliGRAM(s) Oral at bedtime PRN  ondansetron Injectable 4 milliGRAM(s) IV Push every 8 hours PRN                                            ------------------------------------------------------------    LABS:                        15.8   12.93 )-----------( 268      ( 28 Feb 2023 19:50 )             44.9     02-28    137  |  100  |  13  ----------------------------<  117<H>  4.7   |  25  |  0.8    Ca    10.0      28 Feb 2023 19:50  Mg     2.1     02-28    TPro  6.9  /  Alb  4.6  /  TBili  0.2  /  DBili  x   /  AST  15  /  ALT  9   /  AlkPhos  76  02-28    PT/INR - ( 01 Mar 2023 06:30 )   PT: 10.80 sec;   INR: 0.95 ratio    PTT - ( 01 Mar 2023 14:52 )  PTT:41.1 sec    Troponin T, Serum: 0.40 ng/mL *HH* (03-01-23 @ 11:23)    CARDIAC MARKERS ( 01 Mar 2023 11:23 )  x     / 0.40 ng/mL / x     / x     / x      CARDIAC MARKERS ( 01 Mar 2023 06:30 )  x     / 0.42 ng/mL / x     / x     / x      CARDIAC MARKERS ( 28 Feb 2023 19:50 )  x     / 0.12 ng/mL / x     / x     / x                                                -------------------------------------------------------------  RADIOLOGY:    < from: Xray Chest 1 View- PORTABLE-Urgent (02.28.23 @ 20:40) >  Impression: No radiographic evidence of acute cardiopulmonary disease.    < from: TTE Echo Complete w/ Contrast w/ Doppler (03.01.23 @ 12:43) >  Summary:   1. Moderately decreased global left ventricular systolic function.   2. Multiple left ventricular regional wall motion abnormalities exist.   See wall motion findings.   3. LV Ejection Fraction by Paul's Method with a biplane EF of 34 %.   4. Spectral Doppler shows impaired relaxation pattern of left   ventricular myocardial filling (Grade I diastolic dysfunction).   5. Normal left atrial size.   6. Normal right atrial size.   7. Trace mitral valve regurgitation.                                            -------------------------------------------------------------- DENISE BILLINGSLEY 67y Female  MRN#: [id number]  Hospital Day: 1d    Pt is currently admitted with the primary diagnosis of chest pain    66 y/o female with a PMH of HTN and current daily smoker presents to the ED for evaluation of midsternal burning chest pain that began on monday afternoon associated with sweating. Patient reports 10/10 substernal non radiating chest pain for the past few days. It is not associated with excessive and is relieved with maalox. Patient reports pain is worse when laying down. Patient has midsternal tenderness to palpation. Pt reports she woke up at 3AM on tuesday with the same burning pain and took peptobismol with relief. pt reports she woke up again at tuesday around 6pm with even worse burning chest pain and went to the [facility] ED via ambulance. pt had elevated troponin and eloped from the [facility] ED because she "does not trust them there." pt reports her father had an MI with quadruple bypass in his 60s, and her mother had an MI at the age of 60. pt denies fever, chills, cough, back pain, calf pain, dizziness, abdominal pain, n/v/d/c, illicit drug use, use of alcohol, sob, recent trauma, recent surgeries, recent surgeries, recent hospitalizations, leg pain, or leg swelling.    In the ED patient had elevated troponin 0.42    SUBJECTIVE  Overnight events: Yesterday pt had cath which showed 95% stenosis of prox LAD, s/p stent placement.    Subjective complaints: none. Denies fever, chills, chest pain, shortness of breath, n/v/d.    Present Today:   - Avilez:  No [ x ], Yes [   ] : Indication:     - Type of IV Access:       .. CVC/Piccline:  No [ x ], Yes [   ] : Indication:       .. Midline: No [ x ], Yes [   ] : Indication:                                             ----------------------------------------------------------  OBJECTIVE    VITAL SIGNS: Last 24 Hours  T(C): 35.3 (02 Mar 2023 04:54), Max: 37 (01 Mar 2023 07:42)  T(F): 95.6 (02 Mar 2023 04:54), Max: 98.6 (01 Mar 2023 07:42)  HR: 90 (02 Mar 2023 04:54) (68 - 90)  BP: 123/64 (02 Mar 2023 04:54) (95/50 - 123/64)  BP(mean): --  RR: 18 (02 Mar 2023 04:54) (18 - 18)  SpO2: 100% (01 Mar 2023 20:29) (100% - 100%)        PHYSICAL EXAM:  General: well-appearing in NAD.  HEENT: Normocephalic, nontraumatic. PERRL.  LUNGS: Clear to auscultation b/l. No wheezes, rales, or rhonchi.  HEART: RRR. No murmurs, rubs, or gallops.  ABDOMEN: Soft, nontender, nondistended. + bowel sounds.  EXT: Pulses palpable x 4. No lower extremity edema. R wrist bandaged from cath access, clean.  NEURO: AAOx4.  SKIN: Warm, dry.    PAST MEDICAL & SURGICAL HISTORY  Hypertension  History of cholecystectomy                                              -----------------------------------------------------------  ALLERGIES:  penicillin (Hives)  Percodan (Hives)  Vibramycin (Hives; Rash)                                            ------------------------------------------------------------    HOME MEDICATIONS  Home Medications:  acetaminophen 650 mg oral tablet:  (18 Dec 2019 10:59)  metoprolol succinate 25 mg oral tablet, extended release: 1 tab(s) orally once a day (18 Dec 2019 10:59)                           MEDICATIONS:  STANDING MEDICATIONS  aspirin  chewable 81 milliGRAM(s) Oral daily  atorvastatin 80 milliGRAM(s) Oral at bedtime  influenza  Vaccine (HIGH DOSE) 0.7 milliLiter(s) IntraMuscular once  metoprolol tartrate 12.5 milliGRAM(s) Oral every 12 hours  sodium chloride 0.9%. 1000 milliLiter(s) IV Continuous <Continuous>  ticagrelor 90 milliGRAM(s) Oral every 12 hours    PRN MEDICATIONS  acetaminophen     Tablet .. 650 milliGRAM(s) Oral every 6 hours PRN  aluminum hydroxide/magnesium hydroxide/simethicone Suspension 30 milliLiter(s) Oral every 4 hours PRN  melatonin 3 milliGRAM(s) Oral at bedtime PRN  ondansetron Injectable 4 milliGRAM(s) IV Push every 8 hours PRN                                            ------------------------------------------------------------    LABS:                        15.8   12.93 )-----------( 268      ( 28 Feb 2023 19:50 )             44.9     02-28    137  |  100  |  13  ----------------------------<  117<H>  4.7   |  25  |  0.8    Ca    10.0      28 Feb 2023 19:50  Mg     2.1     02-28    TPro  6.9  /  Alb  4.6  /  TBili  0.2  /  DBili  x   /  AST  15  /  ALT  9   /  AlkPhos  76  02-28    PT/INR - ( 01 Mar 2023 06:30 )   PT: 10.80 sec;   INR: 0.95 ratio    PTT - ( 01 Mar 2023 14:52 )  PTT:41.1 sec    Troponin T, Serum: 0.40 ng/mL *HH* (03-01-23 @ 11:23)    CARDIAC MARKERS ( 01 Mar 2023 11:23 )  x     / 0.40 ng/mL / x     / x     / x      CARDIAC MARKERS ( 01 Mar 2023 06:30 )  x     / 0.42 ng/mL / x     / x     / x      CARDIAC MARKERS ( 28 Feb 2023 19:50 )  x     / 0.12 ng/mL / x     / x     / x                                                -------------------------------------------------------------  RADIOLOGY:    < from: Xray Chest 1 View- PORTABLE-Urgent (02.28.23 @ 20:40) >  Impression: No radiographic evidence of acute cardiopulmonary disease.    < from: TTE Echo Complete w/ Contrast w/ Doppler (03.01.23 @ 12:43) >  Summary:   1. Moderately decreased global left ventricular systolic function.   2. Multiple left ventricular regional wall motion abnormalities exist.   See wall motion findings.   3. LV Ejection Fraction by Paul's Method with a biplane EF of 34 %.   4. Spectral Doppler shows impaired relaxation pattern of left   ventricular myocardial filling (Grade I diastolic dysfunction).   5. Normal left atrial size.   6. Normal right atrial size.   7. Trace mitral valve regurgitation.                                            --------------------------------------------------------------

## 2023-03-02 NOTE — PROGRESS NOTE ADULT - SUBJECTIVE AND OBJECTIVE BOX
Cardiology Follow up s/p PCI DENISE SNEED   67y Female  PAST MEDICAL & SURGICAL HISTORY:  Hypertension    History of cholecystectomy      HPI:  66 y/o female with a PMH of HTN and current daily smoker presents to the ED for evaluation of midsternal burning chest pain that began on monday afternoon associated with sweating. Patient reports 10/10 substernal non radiating chest pain for the past few days. It is not associated with excessive and is relieved with maalox. Patient reports pain is worse when laying down. Patient has midsternal tenderness to palpation. Pt reports she woke up at 3AM on tuesday with the same burning pain and took peptobismol with relief. pt reports she woke up again at tuesday around 6pm with even worse burning chest pain and went to the [facility] ED via ambulance. pt had elevated troponin and eloped from the [facility] ED because she "does not trust them there." pt reports her father had an MI with quadruple bypass in his 60s, and her mother had an MI at the age of 60. pt denies fever, chills, cough, back pain, calf pain, dizziness, abdominal pain, n/v/d/c, illicit drug use, use of alcohol, sob, recent trauma, recent surgeries, recent surgeries, recent hospitalizations, leg pain, or leg swelling.    In the ED patient had elevated troponin 0.42      Allergies    penicillin (Hives)  Percodan (Hives)  Vibramycin (Hives; Rash)    Intolerances    Patient seen and examined at bedside. No acute events overnight.  Patient without complaints. Pt ambulated without issues/symptoms  Denies CP, SOB, palpitations, or dizziness  No events on telemetry overnight    Vital Signs Last 24 Hrs  T(C): 35.3 (02 Mar 2023 04:54), Max: 36.1 (01 Mar 2023 20:29)  T(F): 95.6 (02 Mar 2023 04:54), Max: 97 (01 Mar 2023 20:29)  HR: 92 (02 Mar 2023 09:57) (72 - 92)  BP: 109/62 (02 Mar 2023 09:57) (109/62 - 123/64)  BP(mean): --  RR: 18 (02 Mar 2023 04:54) (18 - 18)  SpO2: 100% (01 Mar 2023 20:29) (100% - 100%)    Parameters below as of 01 Mar 2023 20:29  Patient On (Oxygen Delivery Method): room air      MEDICATIONS  (STANDING):  aspirin  chewable 81 milliGRAM(s) Oral daily  atorvastatin 80 milliGRAM(s) Oral at bedtime  influenza  Vaccine (HIGH DOSE) 0.7 milliLiter(s) IntraMuscular once  metoprolol tartrate 25 milliGRAM(s) Oral two times a day  sodium chloride 0.9%. 1000 milliLiter(s) (150 mL/Hr) IV Continuous <Continuous>  ticagrelor 90 milliGRAM(s) Oral every 12 hours    MEDICATIONS  (PRN):  acetaminophen     Tablet .. 650 milliGRAM(s) Oral every 6 hours PRN Temp greater or equal to 38C (100.4F), Mild Pain (1 - 3)  aluminum hydroxide/magnesium hydroxide/simethicone Suspension 30 milliLiter(s) Oral every 4 hours PRN Dyspepsia  melatonin 3 milliGRAM(s) Oral at bedtime PRN Insomnia  ondansetron Injectable 4 milliGRAM(s) IV Push every 8 hours PRN Nausea and/or Vomiting      REVIEW OF SYSTEMS:          All negative except as mentioned in HPI    PHYSICAL EXAM:           CONSTITUTIONAL: Well-developed; well-nourished; in no acute distress  	SKIN: warm, dry  	HEAD: Normocephalic; atraumatic  	EYES: PERRL.  	ENT: No nasal discharge, airway clear, mucous membranes moist  	NECK: Supple; non tender.  	CARD: +S1, +S2, no murmurs, gallops, or rubs. Regular rate and rhythm    	RESP: No wheezes, rales or rhonchi. CTA B/L  	ABD: soft ntnd, + BS x 4 quadrants  	EXT: moves all extremities,  no clubbing, cyanosis or edema  	NEURO: Alert and oriented x3, no focal deficits          PSYCH: Cooperative, appropriate          VASCULAR:  + Rad / + PTs / +  DPs          EXTREMITY:              Right Radial: pressure dressing removed, access site soft, no hematoma, no pain, + pulses, no sign of infection, no numbness            ECG:   < from: 12 Lead ECG (03.02.23 @ 07:36) >  Ventricular Rate 72 BPM    Atrial Rate 72 BPM    P-R Interval 186 ms    QRS Duration 88 ms    Q-T Interval 444 ms    QTC Calculation(Bazett) 486 ms    P Axis 48 degrees    R Axis -66 degrees    T Axis 88 degrees    Diagnosis Line Sinus rhythm with Premature supraventricular complexes  Left axis deviation  Anterolateral infarct , age undetermined  T wave abnormality, consider anterolateral ischemia  Abnormal ECG    Confirmed by Alo Tello ([id number]) on 3/2/2023 8:38:13 AM                                                                                     2D ECHO:  < from: TTE Echo Complete w/ Contrast w/ Doppler (03.01.23 @ 12:43) >  Summary:   1. Moderately decreased global left ventricular systolic function.   2. Multiple left ventricular regional wall motion abnormalities exist.   See wall motion findings.   3. LV Ejection Fraction by Paul's Method with a biplane EF of 34 %.   4. Spectral Doppler shows impaired relaxation pattern of left   ventricular myocardial filling (Grade I diastolic dysfunction).   5. Normal left atrial size.   6. Normal right atrial size.   7. Trace mitral valve regurgitation.    LABS:                        14.3   10.02 )-----------( 240      ( 02 Mar 2023 06:25 )             41.5     03-02    136  |  101  |  14  ----------------------------<  116<H>  4.5   |  24  |  0.8    Ca    9.7      02 Mar 2023 06:25  Mg     2.0     03-02    TPro  6.3  /  Alb  4.2  /  TBili  0.3  /  DBili  x   /  AST  23  /  ALT  10  /  AlkPhos  74  03-02    CARDIAC MARKERS ( 02 Mar 2023 06:25 )  x     / 0.56 ng/mL / x     / x     / x      CARDIAC MARKERS ( 01 Mar 2023 11:23 )  x     / 0.40 ng/mL / x     / x     / x      CARDIAC MARKERS ( 01 Mar 2023 06:30 )  x     / 0.42 ng/mL / x     / x     / x      CARDIAC MARKERS ( 28 Feb 2023 19:50 )  x     / 0.12 ng/mL / x     / x     / x        Magnesium, Serum: 2.0 mg/dL [1.8 - 2.4] (03-02-23 @ 06:25)  LIVER FUNCTIONS - ( 02 Mar 2023 06:25 )  Alb: 4.2 g/dL / Pro: 6.3 g/dL / ALK PHOS: 74 U/L / ALT: 10 U/L / AST: 23 U/L / GGT: x         LDL Cholesterol Calculated: 66 mg/dL   A1C - pending  A/P:  I discussed the case with Cardiologist Dr. Youssef and recommend the following:  S/P PCI:  IV Contrast: 120 mL      Intervention:   - IVUS  - PCI SWATHI to Prox LAD AUC 8    Implants: SYNERGY XD 4.0X12MM to Prox LAD     FINDINGS:     Coronary Dominance: Right dominate    LM: No significant disease.     LAD: 95 % Stenosis to Prox LAD s/p PCI. 50% stenosis to mid LAD    CX: No significant disease    RCA: Moderate diffuse disease     LVEDP: 18mmHg     ESTIMATED BLOOD LOSS: < 10 mL                      OOB to chair, ambulate around the unit                    Increase B-Blocker to 25 mg PO q12hr                   Consider initiating ACEi/ARB later today if B/P permits                    F/U A1C             	     Continue DAPT ( Aspirin 81 mg PO Daily and Brilinta 90 mg PO q12hr ), B-Blocker, Statin Therapy                   Patient pharmacy called in for Brilinta prescription and it is $477 per month, very expensive for patient                     Patient given 1 month of Brilinta 90 mg PO q12hr for FREE from [facility]                    Patient will be switched to Plavix after one month                    Patient agreeing to take DAPT for at least one year or as directed by cardiologist                    Pt given instructions on importance of taking antiplatelet medication or risk acute stent thrombosis/death                   Post cath instructions, access site care and activity restrictions reviewed with patient                     Discussed with patient to return to hospital if experience chest pain, shortness breath, dizziness and site bleeding                   Aggressive risk factor modification, diet counseling, smoking cessation discussed with patient                       Can discharge patient from interventional cardiac standpoint later today, after ambulating without symptoms and tolerating B-Blocker + ACEi/ARB regiment                    Benefits of Cardiac Rehab discussed with patient, All [facility]. Patient instructed to call and make first                               appointment after first f/u visit with Cardiologist                    Patient need to return for Staged Procedure in 4-6 weeks                   Follow up with Cardiology Dr. Afua Rubio in two weeks. Instructed to call and make an appointment

## 2023-03-02 NOTE — DISCHARGE NOTE PROVIDER - NSDCFUADDAPPT_GEN_ALL_CORE_FT
APPTS ARE READY TO BE MADE: [x] YES    Best Family or Patient Contact (if needed):  [phone]  Additional Information about above appointments (if needed):    1: Cardiology with Dr. Joanne Caal  2:   3:     Other comments or requests:    APPTS ARE READY TO BE MADE: [x] YES    Best Family or Patient Contact (if needed):  [phone]  Additional Information about above appointments (if needed):    1: Cardiology with Dr. Joanne Caal  2:   3:     Other comments or requests:   Patient was advised of follow up requests and asked for scheduling assistance. Will await a call back from Joanne May's office to confirm appointment details.

## 2023-03-02 NOTE — DISCHARGE NOTE PROVIDER - NSDCCPCAREPLAN_GEN_ALL_CORE_FT
PRINCIPAL DISCHARGE DIAGNOSIS  Diagnosis: NSTEMI (non-ST elevation myocardial infarction)  Assessment and Plan of Treatment: You came to the hospital with chest pain and were found to be having a non-ST elevation myocardial infarction, which is a type of heart attack. You were taken to the cardiac catheterization lab which found a 95% blockage of one of the vessels that supplies blood to your heart. This blockage was opened with a stent. In the first year after having a stent placed, there is a risk for a blood clot to form on the stent, therefore it is vital to take medications every day to greatly reduce the risk of that happening. Specifically, these medications are aspirin 81mg (aka baby aspirin) plus either ticagrelor (Brilinta) or clopidigrel (Plavix).       PRINCIPAL DISCHARGE DIAGNOSIS  Diagnosis: NSTEMI (non-ST elevation myocardial infarction)  Assessment and Plan of Treatment: **Follow up with Dr. Joanne Caal in 2 weeks  **Take you medications as directed in the discharge packet  You came to the hospital with chest pain and were found to be having a non-ST elevation myocardial infarction, which is a type of heart attack. You were taken to the cardiac catheterization lab which found a 95% blockage of one of the vessels that supplies blood to your heart. This blockage was opened with a stent. In the first year after having a stent placed, there is a risk for a blood clot to form on the stent, therefore it is vital to take medications every day to greatly reduce the risk of that happening. Specifically, these medications are aspirin 81mg (aka baby aspirin) plus either ticagrelor (Brilinta) or clopidigrel (Plavix). It is also very important to take your other medications, metoprolol, lisinopril, and atorvastatin as these are vital for reducing extra strain on your heart.  It is ok for you to increase your activity as tolerated. It is ok to start low and increase your activity over time. Avoid intense exercise such as lifting weights for the first couple weeks. Your cardiologist can provide further guidance about activity at your follow-up appointment.

## 2023-03-02 NOTE — DISCHARGE NOTE PROVIDER - HOSPITAL COURSE
HPI: 68 y/o female with a PMH of HTN and current daily smoker presents to the ED for evaluation of midsternal burning chest pain that began on Monday afternoon associated with sweating. Patient reports 10/10 substernal non radiating chest pain for the past few days. It is not associated with excessive and is relieved with Maalox. Patient reports pain is worse when laying down. Patient has midsternal tenderness to palpation. Pt reports she woke up at 3AM on Tuesday with the same burning pain and took Pepto-Bismol with relief. pt reports she woke up again at Tuesday around 6pm with even worse burning chest pain and went to the [facility] ED via ambulance. pt had elevated troponin and eloped from the [facility] ED because she "does not trust them there." pt reports her father had an MI with quadruple bypass in his 60s, and her mother had an MI at the age of 60. pt denies fever, chills, cough, back pain, calf pain, dizziness, abdominal pain, n/v/d/c, illicit drug use, use of alcohol, sob, recent trauma, recent surgeries, recent surgeries, recent hospitalizations, leg pain, or leg swelling.    Hospital course: Patient was diagnosed with NSTEMI and went to cardiac cath on 3/1, which showed 95% stenosis of proximal LAD and stented. Increased metoprolol dose (pt was taking previously) and started on lisinopril, ASA, ticagrelor.     Patient has been hemodynamically stable and not complaining of chest pain. BP stable since increase in metoprolol and starting lisinopril. Cardiology agreed with discharge today with follow-up in two weeks with Dr. Caal.    # Chest pain  # NSTEMI  - Trop 0.42 >0.40 > 0.56 (after cath)  - TTE: EF 34%, G1DD, moderately decreased global LV systolic function. Multiple LV regional wall motion abnormalities.  - C/w Lipitor 80mg qd  - C/w ASA, ticagrelor  - Increased metoprolol tartrate to 25mg BID  - Starting low dose lisinopril   - A1C 5.7, TSH 2.01  - Lipid panel: , Total cholesterol 157, LDL 66, HDL 39    -Cardiac Cath 3/1/23:  Coronary Dominance: Right dominate  LM: No significant disease.   LAD: 95 % Stenosis to Prox LAD s/p PCI. 50% stenosis to mid LAD  CX: No significant disease  RCA: Moderate diffuse disease  LVEDP: 18mmHg     - Brilinta prescription to pt's pharmacy ([facility]) - cost $477 co-pay per month.    - Discussed with Dr. Youssef, sent Rx to [facility] pharmacy for 30 days free supply, and then pt to be changed to Plavix 75 mg qd on follow-up appointment.   HPI: 66 y/o female with a PMH of HTN and current daily smoker presents to the ED for evaluation of midsternal burning chest pain that began on Monday afternoon associated with sweating. Patient reports 10/10 substernal non radiating chest pain for the past few days. It is not associated with excessive and is relieved with Maalox. Patient reports pain is worse when laying down. Patient has midsternal tenderness to palpation. Pt reports she woke up at 3AM on Tuesday with the same burning pain and took Pepto-Bismol with relief. pt reports she woke up again at Tuesday around 6pm with even worse burning chest pain and went to the [facility] ED via ambulance. pt had elevated troponin and eloped from the [facility] ED because she "does not trust them there." pt reports her father had an MI with quadruple bypass in his 60s, and her mother had an MI at the age of 60. pt denies fever, chills, cough, back pain, calf pain, dizziness, abdominal pain, n/v/d/c, illicit drug use, use of alcohol, sob, recent trauma, recent surgeries, recent surgeries, recent hospitalizations, leg pain, or leg swelling.    Hospital course: Patient was diagnosed with NSTEMI and went to cardiac cath on 3/1, which showed 95% stenosis of proximal LAD and stented. Increased metoprolol dose (pt was taking previously) and started on lisinopril, ASA, ticagrelor.     Patient has been hemodynamically stable and not complaining of chest pain. BP stable since increase in metoprolol and starting lisinopril. Cardiology agreed with discharge today with follow-up in two weeks with Dr. Caal.    # Chest pain  # NSTEMI  - Trop 0.42 >0.40 > 0.56 (after cath)  - TTE: EF 34%, G1DD, moderately decreased global LV systolic function. Multiple LV regional wall motion abnormalities.  - C/w Lipitor 80mg qd  - C/w ASA, ticagrelor  - Increased metoprolol tartrate to 25mg BID  - Starting low dose lisinopril 2.5mg daily, increase the dose as tolerated.   - A1C 5.7, TSH 2.01  - Lipid panel: , Total cholesterol 157, LDL 66, HDL 39    -Cardiac Cath 3/1/23:  Coronary Dominance: Right dominate  LM: No significant disease.   LAD: 95 % Stenosis to Prox LAD s/p PCI. 50% stenosis to mid LAD  CX: No significant disease  RCA: Moderate diffuse disease  LVEDP: 18mmHg     Acute HFrEF:   Stable, new finding, ischemic cardiomyopathy.   Started on Metoprolol and Lisinopril, euvolemic, 2 g sodium diet, avoid volume overload.     - Brilinta prescription to pt's pharmacy ([facility]) - cost $477 co-pay per month.    - Discussed with Dr. Youssef, sent Rx to [facility] pharmacy for 30 days free supply, and then pt to be changed to Plavix 75 mg qd on follow-up appointment.

## 2023-03-03 ENCOUNTER — TRANSCRIPTION ENCOUNTER (OUTPATIENT)
Age: 68
End: 2023-03-03

## 2023-03-04 ENCOUNTER — TRANSCRIPTION ENCOUNTER (OUTPATIENT)
Age: 68
End: 2023-03-04

## 2023-03-05 ENCOUNTER — TRANSCRIPTION ENCOUNTER (OUTPATIENT)
Age: 68
End: 2023-03-05

## 2023-03-06 NOTE — CHART NOTE - NSCHARTNOTESELECT_GEN_ALL_CORE
Interventional Cardiiology/Event Note
Post Cath Procedure Note
Event Note
Interventional Cardiology/Event Note
d/c appt/Event Note

## 2023-03-07 ENCOUNTER — APPOINTMENT (OUTPATIENT)
Dept: CARDIOLOGY | Facility: CLINIC | Age: 68
End: 2023-03-07
Payer: MEDICARE

## 2023-03-07 VITALS
SYSTOLIC BLOOD PRESSURE: 110 MMHG | WEIGHT: 164 LBS | DIASTOLIC BLOOD PRESSURE: 70 MMHG | BODY MASS INDEX: 24.29 KG/M2 | HEART RATE: 88 BPM | HEIGHT: 69 IN

## 2023-03-07 DIAGNOSIS — Z82.49 FAMILY HISTORY OF ISCHEMIC HEART DISEASE AND OTHER DISEASES OF THE CIRCULATORY SYSTEM: ICD-10-CM

## 2023-03-07 DIAGNOSIS — Z78.9 OTHER SPECIFIED HEALTH STATUS: ICD-10-CM

## 2023-03-07 DIAGNOSIS — Z98.61 CORONARY ANGIOPLASTY STATUS: ICD-10-CM

## 2023-03-07 DIAGNOSIS — I25.2 OLD MYOCARDIAL INFARCTION: ICD-10-CM

## 2023-03-07 PROCEDURE — 93000 ELECTROCARDIOGRAM COMPLETE: CPT

## 2023-03-07 PROCEDURE — 99214 OFFICE O/P EST MOD 30 MIN: CPT

## 2023-03-07 RX ORDER — HYDROCODONE BITARTRATE AND ACETAMINOPHEN 7.5; 325 MG/1; MG/1
7.5-325 TABLET ORAL
Qty: 90 | Refills: 0 | Status: DISCONTINUED | COMMUNITY
Start: 2022-10-25 | End: 2023-03-07

## 2023-03-07 RX ORDER — METHOCARBAMOL 500 MG/1
500 TABLET, FILM COATED ORAL 3 TIMES DAILY
Qty: 90 | Refills: 1 | Status: DISCONTINUED | COMMUNITY
Start: 2022-10-25 | End: 2023-03-07

## 2023-03-07 RX ORDER — HYDROCODONE BITARTRATE AND ACETAMINOPHEN 7.5; 325 MG/1; MG/1
7.5-325 TABLET ORAL
Qty: 90 | Refills: 0 | Status: DISCONTINUED | COMMUNITY
Start: 2022-07-29 | End: 2023-03-07

## 2023-03-07 NOTE — HISTORY OF PRESENT ILLNESS
[FreeTextEntry1] : 67 F with CAD s/p PCI pLAD on 3/1/2023 with residual mRCA 80%, HFrEF 35%, anxiety.  Intermittent chest pain, "soreness," nausea, fatigue, RUE tenderness.  Severe diarrhea and myalgias likely due to Atorvastatin.\par

## 2023-03-08 ENCOUNTER — TRANSCRIPTION ENCOUNTER (OUTPATIENT)
Age: 68
End: 2023-03-08

## 2023-03-08 DIAGNOSIS — F17.210 NICOTINE DEPENDENCE, CIGARETTES, UNCOMPLICATED: ICD-10-CM

## 2023-03-08 DIAGNOSIS — I50.21 ACUTE SYSTOLIC (CONGESTIVE) HEART FAILURE: ICD-10-CM

## 2023-03-08 DIAGNOSIS — I11.0 HYPERTENSIVE HEART DISEASE WITH HEART FAILURE: ICD-10-CM

## 2023-03-08 DIAGNOSIS — Z88.0 ALLERGY STATUS TO PENICILLIN: ICD-10-CM

## 2023-03-08 DIAGNOSIS — I25.10 ATHEROSCLEROTIC HEART DISEASE OF NATIVE CORONARY ARTERY WITHOUT ANGINA PECTORIS: ICD-10-CM

## 2023-03-08 DIAGNOSIS — I25.5 ISCHEMIC CARDIOMYOPATHY: ICD-10-CM

## 2023-03-08 DIAGNOSIS — I21.4 NON-ST ELEVATION (NSTEMI) MYOCARDIAL INFARCTION: ICD-10-CM

## 2023-03-08 DIAGNOSIS — G47.00 INSOMNIA, UNSPECIFIED: ICD-10-CM

## 2023-03-08 DIAGNOSIS — I25.84 CORONARY ATHEROSCLEROSIS DUE TO CALCIFIED CORONARY LESION: ICD-10-CM

## 2023-03-21 ENCOUNTER — APPOINTMENT (OUTPATIENT)
Dept: PAIN MANAGEMENT | Facility: CLINIC | Age: 68
End: 2023-03-21
Payer: MEDICARE

## 2023-03-21 VITALS — HEIGHT: 69 IN | BODY MASS INDEX: 24.29 KG/M2 | WEIGHT: 164 LBS

## 2023-03-21 PROCEDURE — 99213 OFFICE O/P EST LOW 20 MIN: CPT

## 2023-03-21 RX ORDER — TICAGRELOR 90 MG/1
90 TABLET ORAL TWICE DAILY
Qty: 180 | Refills: 3 | Status: DISCONTINUED | COMMUNITY
Start: 1900-01-01 | End: 2023-03-21

## 2023-03-21 NOTE — ASSESSMENT
[FreeTextEntry1] : 67 year old female presenting with cervical radicular pain. She underwent series of three JADYN with almost 90% pain relief. Her pain had increased recently after STENT placement. We cannot do any other injections at this time,and Physical Therapy option was offered but the patient deferred at this time.\par No medications has been prescribed at today's visit.\par We will re-evaluate in about 6 month. II have informed patient that if any emergency arise,or any questions, to call me at any time. \par  \par Raman Huff PA-C \par Fermin Harvey MD \par \par \par

## 2023-03-21 NOTE — PHYSICAL EXAM
[] : full ROM with mild stiffness [Flexion] : flexion [de-identified] : Neck: Palpation of the cervical spine is as follows: bilateral paracervical spasm and bilateral paracervical tenderness. Range of motion of the cervical spine is as follows: diminished range of motion in all planes Stiffness at extremes of extension, rotation to right and rotation to left. Neurological testing for the cervical spine is as follows: positive bilateral facet loading.
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
## 2023-03-31 ENCOUNTER — OUTPATIENT (OUTPATIENT)
Dept: OUTPATIENT SERVICES | Facility: HOSPITAL | Age: 68
LOS: 1 days | End: 2023-03-31
Payer: MEDICARE

## 2023-03-31 VITALS
OXYGEN SATURATION: 100 % | DIASTOLIC BLOOD PRESSURE: 72 MMHG | HEIGHT: 70 IN | RESPIRATION RATE: 18 BRPM | HEART RATE: 100 BPM | WEIGHT: 167.99 LBS | TEMPERATURE: 98 F | SYSTOLIC BLOOD PRESSURE: 128 MMHG

## 2023-03-31 DIAGNOSIS — Z95.5 PRESENCE OF CORONARY ANGIOPLASTY IMPLANT AND GRAFT: Chronic | ICD-10-CM

## 2023-03-31 DIAGNOSIS — I25.10 ATHEROSCLEROTIC HEART DISEASE OF NATIVE CORONARY ARTERY WITHOUT ANGINA PECTORIS: Chronic | ICD-10-CM

## 2023-03-31 DIAGNOSIS — Z90.49 ACQUIRED ABSENCE OF OTHER SPECIFIED PARTS OF DIGESTIVE TRACT: Chronic | ICD-10-CM

## 2023-03-31 DIAGNOSIS — I25.10 ATHEROSCLEROTIC HEART DISEASE OF NATIVE CORONARY ARTERY WITHOUT ANGINA PECTORIS: ICD-10-CM

## 2023-03-31 DIAGNOSIS — Z01.818 ENCOUNTER FOR OTHER PREPROCEDURAL EXAMINATION: ICD-10-CM

## 2023-03-31 LAB
ALBUMIN SERPL ELPH-MCNC: 5.1 G/DL — SIGNIFICANT CHANGE UP (ref 3.5–5.2)
ALP SERPL-CCNC: 80 U/L — SIGNIFICANT CHANGE UP (ref 30–115)
ALT FLD-CCNC: 11 U/L — SIGNIFICANT CHANGE UP (ref 0–41)
ANION GAP SERPL CALC-SCNC: 13 MMOL/L — SIGNIFICANT CHANGE UP (ref 7–14)
APTT BLD: 28.6 SEC — SIGNIFICANT CHANGE UP (ref 27–39.2)
AST SERPL-CCNC: 13 U/L — SIGNIFICANT CHANGE UP (ref 0–41)
BASOPHILS # BLD AUTO: 0.05 K/UL — SIGNIFICANT CHANGE UP (ref 0–0.2)
BASOPHILS NFR BLD AUTO: 0.5 % — SIGNIFICANT CHANGE UP (ref 0–1)
BILIRUB SERPL-MCNC: 0.3 MG/DL — SIGNIFICANT CHANGE UP (ref 0.2–1.2)
BUN SERPL-MCNC: 14 MG/DL — SIGNIFICANT CHANGE UP (ref 10–20)
CALCIUM SERPL-MCNC: 10.5 MG/DL — SIGNIFICANT CHANGE UP (ref 8.4–10.5)
CHLORIDE SERPL-SCNC: 103 MMOL/L — SIGNIFICANT CHANGE UP (ref 98–110)
CO2 SERPL-SCNC: 25 MMOL/L — SIGNIFICANT CHANGE UP (ref 17–32)
CREAT SERPL-MCNC: 0.7 MG/DL — SIGNIFICANT CHANGE UP (ref 0.7–1.5)
EGFR: 96 ML/MIN/1.73M2 — SIGNIFICANT CHANGE UP
EOSINOPHIL # BLD AUTO: 0.08 K/UL — SIGNIFICANT CHANGE UP (ref 0–0.7)
EOSINOPHIL NFR BLD AUTO: 0.9 % — SIGNIFICANT CHANGE UP (ref 0–8)
GLUCOSE SERPL-MCNC: 82 MG/DL — SIGNIFICANT CHANGE UP (ref 70–99)
HCT VFR BLD CALC: 43.4 % — SIGNIFICANT CHANGE UP (ref 37–47)
HGB BLD-MCNC: 15.1 G/DL — SIGNIFICANT CHANGE UP (ref 12–16)
IMM GRANULOCYTES NFR BLD AUTO: 0.3 % — SIGNIFICANT CHANGE UP (ref 0.1–0.3)
INR BLD: 0.91 RATIO — SIGNIFICANT CHANGE UP (ref 0.65–1.3)
LYMPHOCYTES # BLD AUTO: 1.9 K/UL — SIGNIFICANT CHANGE UP (ref 1.2–3.4)
LYMPHOCYTES # BLD AUTO: 20.9 % — SIGNIFICANT CHANGE UP (ref 20.5–51.1)
MCHC RBC-ENTMCNC: 33 PG — HIGH (ref 27–31)
MCHC RBC-ENTMCNC: 34.8 G/DL — SIGNIFICANT CHANGE UP (ref 32–37)
MCV RBC AUTO: 94.8 FL — SIGNIFICANT CHANGE UP (ref 81–99)
MONOCYTES # BLD AUTO: 0.46 K/UL — SIGNIFICANT CHANGE UP (ref 0.1–0.6)
MONOCYTES NFR BLD AUTO: 5.1 % — SIGNIFICANT CHANGE UP (ref 1.7–9.3)
NEUTROPHILS # BLD AUTO: 6.58 K/UL — HIGH (ref 1.4–6.5)
NEUTROPHILS NFR BLD AUTO: 72.3 % — SIGNIFICANT CHANGE UP (ref 42.2–75.2)
NRBC # BLD: 0 /100 WBCS — SIGNIFICANT CHANGE UP (ref 0–0)
PLATELET # BLD AUTO: 226 K/UL — SIGNIFICANT CHANGE UP (ref 130–400)
POTASSIUM SERPL-MCNC: 5.5 MMOL/L — HIGH (ref 3.5–5)
POTASSIUM SERPL-SCNC: 5.5 MMOL/L — HIGH (ref 3.5–5)
PROT SERPL-MCNC: 7.2 G/DL — SIGNIFICANT CHANGE UP (ref 6–8)
PROTHROM AB SERPL-ACNC: 10.4 SEC — SIGNIFICANT CHANGE UP (ref 9.95–12.87)
RBC # BLD: 4.58 M/UL — SIGNIFICANT CHANGE UP (ref 4.2–5.4)
RBC # FLD: 12 % — SIGNIFICANT CHANGE UP (ref 11.5–14.5)
SODIUM SERPL-SCNC: 141 MMOL/L — SIGNIFICANT CHANGE UP (ref 135–146)
WBC # BLD: 9.1 K/UL — SIGNIFICANT CHANGE UP (ref 4.8–10.8)
WBC # FLD AUTO: 9.1 K/UL — SIGNIFICANT CHANGE UP (ref 4.8–10.8)

## 2023-03-31 PROCEDURE — 99214 OFFICE O/P EST MOD 30 MIN: CPT | Mod: 25

## 2023-03-31 PROCEDURE — 80053 COMPREHEN METABOLIC PANEL: CPT

## 2023-03-31 PROCEDURE — 85025 COMPLETE CBC W/AUTO DIFF WBC: CPT

## 2023-03-31 PROCEDURE — 93005 ELECTROCARDIOGRAM TRACING: CPT

## 2023-03-31 PROCEDURE — 85610 PROTHROMBIN TIME: CPT

## 2023-03-31 PROCEDURE — 93010 ELECTROCARDIOGRAM REPORT: CPT

## 2023-03-31 PROCEDURE — 36415 COLL VENOUS BLD VENIPUNCTURE: CPT

## 2023-03-31 PROCEDURE — 85730 THROMBOPLASTIN TIME PARTIAL: CPT

## 2023-03-31 NOTE — H&P PST ADULT - HISTORY OF PRESENT ILLNESS
[place] with c/o chest pain.  Left AMA but returned.  Dx's with non-STEMI and one stent was placed.     Pt reports that she is being tested today for another cath and possibly another stent.  Denies chest pain at this writing but reports chest is "sore"   Reports intermittent  palp;  reports palpitations in PAST; states "nervous"  HR = 104  PATIENT CURRENTLY DENIES   DYSURIA, OR URI WITHIN THE IN PAST 2 WEEKS  EXERCISE  TOLERANCE  2 blocks or 1-2 FLIGHT OF STAIRS  WITHOUT SHORTNESS OF BREATH  -Denies travel outside the [place] in the past 30 days  -Patient denies any signs or symptoms of COVID 19 and denies contact with known positive individuals.    -Patient has appointment for COVID testing pre-procedure and acknowledges its time and place.    -Patient was instructed to quarantine pre-procedure, practice exposure control measures, continue to self-monitor and report any concerns to their proceduralist.  -Pt advised self quarantine till day of procedure    ANESTHESIA ALERT  YES --Difficult Airway  NO--History of neck surgery or radiation  yes --Limited ROM of neck  NO--History of Malignant hyperthermia  NO--No personal or family history of Pseudocholinesterase deficiency.  NO--Prior Anesthesia Complication  NO--Latex Allergy  NO--Loose teeth  NO--History of Rheumatoid Arthritis  NO--Bleeding risk  NO--MANDI  NO--Other_____    -PT DENIES ANY RASHES, ABRASION, OR OPEN WOUNDS   -AS PER THE PT, THIS IS HIS/HER COMPLETE MEDICAL AND SURGICAL HX, INCLUDING MEDICATIONS PRESCRIBED AND OVER THE COUNTER    Patient verbalized understanding of instructions and was given the opportunity to ask questions and have them answered.    Pt denies any suicidal ideation or thoughts.  Pt states not a threat to self or others.  DENIES DOMESTIC VIOLENCE      ++++++PT HAS BEEN TOLD TO STOP BRILLINTA AND START PLAVIX   (HAS THREE BRILLINTA REMAINING)   WILL CONFER WITH DR MICHEL AS TO HOW TO SWITCH MEDS)

## 2023-03-31 NOTE — H&P PST ADULT - NSICDXPASTMEDICALHX_GEN_ALL_CORE_FT
PAST MEDICAL HISTORY:  CAD (coronary artery disease)     Cervical herniated disc     Hypertension

## 2023-03-31 NOTE — H&P PST ADULT - NSICDXPASTSURGICALHX_GEN_ALL_CORE_FT
fabien all pertinent systems normal PAST SURGICAL HISTORY:  Coronary artery disease with cardiac symptoms     H/O heart artery stent     History of cholecystectomy

## 2023-03-31 NOTE — H&P PST ADULT - BSA (M2)
Please abstract the following data from this visit with this patient into the appropriate field in Epic:    Tests that can be patient reported without a hard copy:    Pap smear done on this date: 1/6/2021 normal pap and negative HPV  01/06/2021 Pap, LB + Reflex hr HPV CERVIX&CERVIX     Case Report see note   Complete [facility] - Lab: [id number]     Other Tests found in the patient's chart through Chart Review/Care Everywhere:    {Abstract Quality List (Optional):[id number]}    Note to Abstraction: If this section is blank, no results were found via Chart Review/Care Everywhere.        1.94

## 2023-04-01 DIAGNOSIS — Z01.818 ENCOUNTER FOR OTHER PREPROCEDURAL EXAMINATION: ICD-10-CM

## 2023-04-01 DIAGNOSIS — I25.10 ATHEROSCLEROTIC HEART DISEASE OF NATIVE CORONARY ARTERY WITHOUT ANGINA PECTORIS: ICD-10-CM

## 2023-04-07 ENCOUNTER — LABORATORY RESULT (OUTPATIENT)
Age: 68
End: 2023-04-07

## 2023-04-07 ENCOUNTER — OUTPATIENT (OUTPATIENT)
Dept: OUTPATIENT SERVICES | Facility: HOSPITAL | Age: 68
LOS: 1 days | End: 2023-04-07
Payer: MEDICARE

## 2023-04-07 DIAGNOSIS — Z95.5 PRESENCE OF CORONARY ANGIOPLASTY IMPLANT AND GRAFT: Chronic | ICD-10-CM

## 2023-04-07 DIAGNOSIS — Z02.9 ENCOUNTER FOR ADMINISTRATIVE EXAMINATIONS, UNSPECIFIED: ICD-10-CM

## 2023-04-07 DIAGNOSIS — Z90.49 ACQUIRED ABSENCE OF OTHER SPECIFIED PARTS OF DIGESTIVE TRACT: Chronic | ICD-10-CM

## 2023-04-07 DIAGNOSIS — I25.10 ATHEROSCLEROTIC HEART DISEASE OF NATIVE CORONARY ARTERY WITHOUT ANGINA PECTORIS: Chronic | ICD-10-CM

## 2023-04-07 PROBLEM — M50.20 OTHER CERVICAL DISC DISPLACEMENT, UNSPECIFIED CERVICAL REGION: Chronic | Status: ACTIVE | Noted: 2023-03-31

## 2023-04-07 LAB
POTASSIUM SERPL-MCNC: 4.3 MMOL/L — SIGNIFICANT CHANGE UP (ref 3.5–5)
POTASSIUM SERPL-SCNC: 4.3 MMOL/L — SIGNIFICANT CHANGE UP (ref 3.5–5)

## 2023-04-07 PROCEDURE — 84132 ASSAY OF SERUM POTASSIUM: CPT

## 2023-04-07 PROCEDURE — 36415 COLL VENOUS BLD VENIPUNCTURE: CPT

## 2023-04-08 DIAGNOSIS — Z02.9 ENCOUNTER FOR ADMINISTRATIVE EXAMINATIONS, UNSPECIFIED: ICD-10-CM

## 2023-04-10 ENCOUNTER — OUTPATIENT (OUTPATIENT)
Dept: INPATIENT UNIT | Facility: HOSPITAL | Age: 68
LOS: 1 days | Discharge: HOME | End: 2023-04-10
Payer: MEDICARE

## 2023-04-10 VITALS — HEIGHT: 69 IN | WEIGHT: 164.02 LBS

## 2023-04-10 DIAGNOSIS — Z90.49 ACQUIRED ABSENCE OF OTHER SPECIFIED PARTS OF DIGESTIVE TRACT: Chronic | ICD-10-CM

## 2023-04-10 DIAGNOSIS — I25.10 ATHEROSCLEROTIC HEART DISEASE OF NATIVE CORONARY ARTERY WITHOUT ANGINA PECTORIS: ICD-10-CM

## 2023-04-10 DIAGNOSIS — I25.10 ATHEROSCLEROTIC HEART DISEASE OF NATIVE CORONARY ARTERY WITHOUT ANGINA PECTORIS: Chronic | ICD-10-CM

## 2023-04-10 DIAGNOSIS — I25.84 CORONARY ATHEROSCLEROSIS DUE TO CALCIFIED CORONARY LESION: ICD-10-CM

## 2023-04-10 DIAGNOSIS — Z95.5 PRESENCE OF CORONARY ANGIOPLASTY IMPLANT AND GRAFT: Chronic | ICD-10-CM

## 2023-04-10 LAB
ANION GAP SERPL CALC-SCNC: 12 MMOL/L — SIGNIFICANT CHANGE UP (ref 7–14)
BUN SERPL-MCNC: 14 MG/DL — SIGNIFICANT CHANGE UP (ref 10–20)
CALCIUM SERPL-MCNC: 10.3 MG/DL — SIGNIFICANT CHANGE UP (ref 8.4–10.5)
CHLORIDE SERPL-SCNC: 103 MMOL/L — SIGNIFICANT CHANGE UP (ref 98–110)
CO2 SERPL-SCNC: 26 MMOL/L — SIGNIFICANT CHANGE UP (ref 17–32)
CREAT SERPL-MCNC: 0.8 MG/DL — SIGNIFICANT CHANGE UP (ref 0.7–1.5)
EGFR: 82 ML/MIN/1.73M2 — SIGNIFICANT CHANGE UP
GLUCOSE SERPL-MCNC: 88 MG/DL — SIGNIFICANT CHANGE UP (ref 70–99)
HCT VFR BLD CALC: 37 % — SIGNIFICANT CHANGE UP (ref 37–47)
HCT VFR BLD CALC: 42.9 % — SIGNIFICANT CHANGE UP (ref 37–47)
HGB BLD-MCNC: 12.9 G/DL — SIGNIFICANT CHANGE UP (ref 12–16)
HGB BLD-MCNC: 14.8 G/DL — SIGNIFICANT CHANGE UP (ref 12–16)
MCHC RBC-ENTMCNC: 33.4 PG — HIGH (ref 27–31)
MCHC RBC-ENTMCNC: 33.8 PG — HIGH (ref 27–31)
MCHC RBC-ENTMCNC: 34.5 G/DL — SIGNIFICANT CHANGE UP (ref 32–37)
MCHC RBC-ENTMCNC: 34.9 G/DL — SIGNIFICANT CHANGE UP (ref 32–37)
MCV RBC AUTO: 96.8 FL — SIGNIFICANT CHANGE UP (ref 81–99)
MCV RBC AUTO: 96.9 FL — SIGNIFICANT CHANGE UP (ref 81–99)
NRBC # BLD: 0 /100 WBCS — SIGNIFICANT CHANGE UP (ref 0–0)
NRBC # BLD: 0 /100 WBCS — SIGNIFICANT CHANGE UP (ref 0–0)
PLATELET # BLD AUTO: 191 K/UL — SIGNIFICANT CHANGE UP (ref 130–400)
PLATELET # BLD AUTO: 222 K/UL — SIGNIFICANT CHANGE UP (ref 130–400)
POTASSIUM SERPL-MCNC: 4.5 MMOL/L — SIGNIFICANT CHANGE UP (ref 3.5–5)
POTASSIUM SERPL-SCNC: 4.5 MMOL/L — SIGNIFICANT CHANGE UP (ref 3.5–5)
RBC # BLD: 3.82 M/UL — LOW (ref 4.2–5.4)
RBC # BLD: 4.43 M/UL — SIGNIFICANT CHANGE UP (ref 4.2–5.4)
RBC # FLD: 12 % — SIGNIFICANT CHANGE UP (ref 11.5–14.5)
RBC # FLD: 12.1 % — SIGNIFICANT CHANGE UP (ref 11.5–14.5)
SODIUM SERPL-SCNC: 141 MMOL/L — SIGNIFICANT CHANGE UP (ref 135–146)
WBC # BLD: 8.11 K/UL — SIGNIFICANT CHANGE UP (ref 4.8–10.8)
WBC # BLD: 8.38 K/UL — SIGNIFICANT CHANGE UP (ref 4.8–10.8)
WBC # FLD AUTO: 8.11 K/UL — SIGNIFICANT CHANGE UP (ref 4.8–10.8)
WBC # FLD AUTO: 8.38 K/UL — SIGNIFICANT CHANGE UP (ref 4.8–10.8)

## 2023-04-10 PROCEDURE — 93010 ELECTROCARDIOGRAM REPORT: CPT

## 2023-04-10 PROCEDURE — 85027 COMPLETE CBC AUTOMATED: CPT

## 2023-04-10 PROCEDURE — 93458 L HRT ARTERY/VENTRICLE ANGIO: CPT | Mod: 26,XU

## 2023-04-10 PROCEDURE — C1894: CPT

## 2023-04-10 PROCEDURE — C1887: CPT

## 2023-04-10 PROCEDURE — 92978 ENDOLUMINL IVUS OCT C 1ST: CPT | Mod: 26,RC

## 2023-04-10 PROCEDURE — 92928 PRQ TCAT PLMT NTRAC ST 1 LES: CPT | Mod: RC

## 2023-04-10 PROCEDURE — 36415 COLL VENOUS BLD VENIPUNCTURE: CPT

## 2023-04-10 PROCEDURE — 80048 BASIC METABOLIC PNL TOTAL CA: CPT

## 2023-04-10 PROCEDURE — C1725: CPT

## 2023-04-10 PROCEDURE — C1874: CPT

## 2023-04-10 PROCEDURE — C1769: CPT

## 2023-04-10 PROCEDURE — 93005 ELECTROCARDIOGRAM TRACING: CPT

## 2023-04-10 RX ORDER — PANTOPRAZOLE SODIUM 20 MG/1
1 TABLET, DELAYED RELEASE ORAL
Qty: 30 | Refills: 2
Start: 2023-04-10 | End: 2023-07-08

## 2023-04-10 RX ORDER — ASPIRIN/CALCIUM CARB/MAGNESIUM 324 MG
1 TABLET ORAL
Qty: 30 | Refills: 2
Start: 2023-04-10 | End: 2023-07-08

## 2023-04-10 RX ORDER — CLOPIDOGREL BISULFATE 75 MG/1
1 TABLET, FILM COATED ORAL
Refills: 0 | DISCHARGE

## 2023-04-10 RX ORDER — CLOPIDOGREL BISULFATE 75 MG/1
1 TABLET, FILM COATED ORAL
Qty: 30 | Refills: 3
Start: 2023-04-10 | End: 2023-08-07

## 2023-04-10 NOTE — CHART NOTE - NSCHARTNOTEFT_GEN_A_CORE
PREOPERATIVE DAY OF PROCEDURE EVALUATION:    67 Yr old female with PMH of Anxiety, Umbilical Hernia,  Nstemi, Covid , Former smoker, CAD s/p PCI pLAD on 3/1/2023 with residual mRCA 80%, HFrEF 35%, presents for staged  PCI today. [place] with c/o chest pain.  Left AMA  but returned.  Dx's with non-STEMI and one stent was placed.  Pt reports that she is is here  today for another cath and staged PCI  stent.  Denies chest pain at this writing but reports c/o nausea  Reports intermittent c/o  palpitations in PAST; states "nervous"      I have personally seen and examined the patient.  I agree with the history and physical which I have reviewed and noted any changes below.  (Signed electronically by __________)  04-10-23 @ 07:40      Cath Bleeding Risk: 1.8%  Prehydration: NS at 75 ml/hr ml initiated  prior to cardiac cath  Tucker test positive PREOPERATIVE DAY OF PROCEDURE EVALUATION:    67 Yr old female with PMH of Anxiety, Umbilical Hernia,  Nstemi, Covid , Former smoker, CAD s/p PCI pLAD on 3/1/2023 with residual mRCA 80%, HFrEF 35%, presents for staged  PCI today. Pt [place] [facility] [place] end of [place] with c/o chest pain.  Left AMA  but returned.  Dx's with non-STEMI and one stent was placed.  Pt reports that she is is here  today for another cath and staged PCI  stent.  Denies chest pain at this writing but reports c/o nausea  Reports intermittent c/o  palpitations in PAST; states "nervous"      I have personally seen and examined the patient.  I agree with the history and physical which I have reviewed and noted any changes below.  (Signed electronically by __________)  04-10-23 @ 07:40      Cath Bleeding Risk: 1.8%  Prehydration: NS at 250 ml/hr ml bolus prior to cardiac cath  Tucker test positive PREOPERATIVE DAY OF PROCEDURE EVALUATION:    67 Yr old female with PMH of  HTN,FH Cad  Nstemi, Ischemic cardiomyopathy, Covid 1 yr ago,Anxiety, Umbilical Hernia, Former smoker, CAD s/p PCI pLAD on 3/1/2023 with residual mRCA 80%, HFrEF 35%, presents for staged  PCI today. Pt [place] [facility] [place] at [place] end of [place] with c/o chest pain.  Left AMA  but returned.  Dx's with non-STEMI and one stent was placed.  Pt reports that she is is here  today for another cath and staged PCI  stent.  Denies chest pain at this writing but reports c/o nausea  Reports intermittent c/o  palpitations in PAST; states "nervous"    -Cardiac Cath 3/1/23:  Coronary Dominance: Right dominate  LM: No significant disease.  LAD: 95 % Stenosis to Prox LAD s/p PCI. 50% stenosis to mid LAD  CX: No significant disease  RCA: Moderate diffuse disease  LVEDP: 18mmHg    I have personally seen and examined the patient.  I agree with the history and physical which I have reviewed and noted any changes below.  (Signed electronically by __________)  04-10-23 @ 07:40      Cath Bleeding Risk: 1.8%  Prehydration: NS at 250 ml/hr ml bolus prior to cardiac cath  Tucker test positive

## 2023-04-10 NOTE — PROGRESS NOTE ADULT - SUBJECTIVE AND OBJECTIVE BOX
Cardiology Follow up s/p PCI    DENISE BILLINGSLEY   67y Female  PAST MEDICAL & SURGICAL HISTORY:  Hypertension      CAD (coronary artery disease)      Cervical herniated disc      History of cholecystectomy      Coronary artery disease with cardiac symptoms      H/O heart artery stent           HPI:    Allergies    penicillin (Hives)  Percodan (Hives)  Vibramycin (Hives; Rash)    Intolerances      Patient without complaints. Pt ambulated without issues/symptoms  Denies CP, SOB, palpitations, or dizziness      Vital Signs Last 24 Hrs  T(C): --  T(F): --  HR: --  BP: --  BP(mean): --  RR: --  SpO2: --        MEDICATIONS  (STANDING):    MEDICATIONS  (PRN):      REVIEW OF SYSTEMS:          All negative except as mentioned in HPI    PHYSICAL EXAM:           CONSTITUTIONAL: Well-developed; well-nourished; in no acute distress  	SKIN: warm, dry  	HEAD: Normocephalic; atraumatic  	EYES: PERRL.  	ENT: No nasal discharge, airway clear, mucous membranes moist  	NECK: Supple; non tender.  	CARD: +S1, +S2, no murmurs, gallops, or rubs. Regular rate and rhythm    	RESP: No wheezes, rales or rhonchi. CTA B/L  	ABD: soft ntnd, + BS x 4 quadrants  	EXT: moves all extremities,  no clubbing, cyanosis or edema  	NEURO: Alert and oriented x3, no focal deficits          PSYCH: Cooperative, appropriate          VASCULAR:  + Rad / + PTs / + DPs          EXTREMITY:             	   Right Radial: Dressing D/C/I, pressure dressing removed, access site soft, no hematoma, no pain, + pulses, no sign of infection, no numbness            ECG:           P                                                                                                      2D ECHO:    LABS:          P              14.8   8.38  )-----------( 222      ( 10 Apr 2023 07:25 )             42.9     04-10    141  |  103  |  14  ----------------------------<  88  4.5   |  26  |  0.8    Ca    10.3      10 Apr 2023 07:25              A/P:  I discussed the case with Cardiologist Dr. Youssef  and recommend the following:    S/P PCI:                    Intervention:  Successful PCI of  mRCA with balloon angioplasty s/p SWATHI x1    Implants:       SYNERGY 3.0X38MM to mRCA                  AUC: 7     FINDINGS:   Coronary Dominance: right  LM: mild disease  LAD: mild disease. patent stent   CX: mild disease  RCA: moderate diffuse disease. 80% mRCA stenosis    LVEDP:  22 mmHg     EF:  34% on TTE 3/2023     ESTIMATED BLOOD LOSS: < 10 mL   	     Continue DAPT ( Aspirin 81 mg PO Daily and  Plavix 75 mg daily ),  B-Blocker, Statin Therapy, Script to pharmacy for PPI                   patient aware and agreeable, Rx available for pickup                   Patient given 30 day supply of ( Aspirin 81 mg daily and Plavix 75 mg daily ) to take at home                   CBC @ 1330 hrs                   EKG prior to d/c @ 1500 hrs                   NS @ 150 ml/hr x 6hrs                   Monitor access site                   Patient agreeing to take DAPT for at least one year or as directed by cardiologist                    Pt given instructions on importance of taking antiplatelet medication or risk acute stent thrombosis/death                   Post cath instructions, access site care and activity restrictions reviewed with patient                     Discussed with patient to return to hospital if experience chest pain, shortness breath, dizziness and site bleeding                   Aggressive risk factor modification, diet counseling, smoking cessation discussed with patient                    Cardiac rehab info provided/referral and communication to cardiac rehab provided                      Can discharge patient @1500 hrs from cardiac standpoint after ambulating without symptoms, access site wnl, labs and ECG reviewed                    Follow up with Cardiology Dr. Youssef in two weeks.  Instructed to call and make an appointment                                         Discharge Instructions as follows:                   -Continue medical regimen as prescribed to prevent chest pain.                   -Continue dual anti-platelet, b-blocker,Ace, statin, PPI                   -Instructed to call 911 if chest pain, shortness of breath or bleeding from access site                     -No heavy lifting of >10 lbs x 1 week,                    - No driving x 24 hrs                   - No baths, swimming pools x 1 week,  may shower                   -Low sodium low fat low cholesterol diet                   - Follow up with Cardiologist in 1-2 weeks after discharge                                    Cardiology Follow up s/p PCI    DENISE BILLINGSLEY   67y Female  PAST MEDICAL & SURGICAL HISTORY:  Hypertension      CAD (coronary artery disease)      Cervical herniated disc      History of cholecystectomy      Coronary artery disease with cardiac symptoms      H/O heart artery stent           HPI:    Allergies    penicillin (Hives)  Percodan (Hives)  Vibramycin (Hives; Rash)    Intolerances      Patient without complaints. Pt ambulated without issues/symptoms  Denies CP, SOB, palpitations, or dizziness      Vital Signs Last 24 Hrs  T(C): --  T(F): --  HR: --  BP: --  BP(mean): --  RR: --  SpO2: --        MEDICATIONS  (STANDING):    MEDICATIONS  (PRN):      REVIEW OF SYSTEMS:          All negative except as mentioned in HPI    PHYSICAL EXAM:           CONSTITUTIONAL: Well-developed; well-nourished; in no acute distress  	SKIN: warm, dry  	HEAD: Normocephalic; atraumatic  	EYES: PERRL.  	ENT: No nasal discharge, airway clear, mucous membranes moist  	NECK: Supple; non tender.  	CARD: +S1, +S2, no murmurs, gallops, or rubs. Regular rate and rhythm    	RESP: No wheezes, rales or rhonchi. CTA B/L  	ABD: soft ntnd, + BS x 4 quadrants  	EXT: moves all extremities,  no clubbing, cyanosis or edema  	NEURO: Alert and oriented x3, no focal deficits          PSYCH: Cooperative, appropriate          VASCULAR:  + Rad / + PTs / + DPs          EXTREMITY:             	   Right Radial: Dressing D/C/I, pressure dressing removed, access site soft, no hematoma, no pain, + pulses, no sign of infection, no numbness            ECG:           P                                                                                                      2D ECHO:    LABS:          P              14.8   8.38  )-----------( 222      ( 10 Apr 2023 07:25 )             42.9     04-10    141  |  103  |  14  ----------------------------<  88  4.5   |  26  |  0.8    Ca    10.3      10 Apr 2023 07:25              A/P:  I discussed the case with Cardiologist Dr. Youssef  and recommend the following:    S/P PCI:                    Intervention:  Successful PCI of  mRCA with balloon angioplasty s/p SWATHI x1    Implants:       SYNERGY 3.0X38MM to mRCA                  AUC: 7     FINDINGS:   Coronary Dominance: right  LM: mild disease  LAD: mild disease. patent stent   CX: mild disease  RCA: moderate diffuse disease. 80% mRCA stenosis    LVEDP:  22 mmHg     EF:  34% on TTE 3/2023     ESTIMATED BLOOD LOSS: < 10 mL   	     Continue DAPT ( Aspirin 81 mg PO Daily and  Plavix 75 mg daily ),  B-Blocker(pt maintained on current twice daily dosing regimen as she refused to change to extended release today, pt will reconsider and this option and discuss with Dr Youssef outpatient), Statin Therapy, Script to pharmacy for PPI                   patient aware and agreeable, Rx available for pickup                   Patient given 30 day supply of ( Aspirin 81 mg daily and Plavix 75 mg daily ) to take at home                   CBC @ 1330 hrs                   EKG prior to d/c @ 1500 hrs                   NS @ 150 ml/hr x 6hrs                   Monitor access site                   Patient agreeing to take DAPT for at least one year or as directed by cardiologist                    Pt given instructions on importance of taking antiplatelet medication or risk acute stent thrombosis/death                   Post cath instructions, access site care and activity restrictions reviewed with patient                     Discussed with patient to return to hospital if experience chest pain, shortness breath, dizziness and site bleeding                   Aggressive risk factor modification, diet counseling, smoking cessation discussed with patient                    Cardiac rehab info provided/referral and communication to cardiac rehab provided                      Can discharge patient @1500 hrs from cardiac standpoint after ambulating without symptoms, access site wnl, labs and ECG reviewed                    Follow up with Cardiology Dr. Youssef in two weeks.  Instructed to call and make an appointment                                         Discharge Instructions as follows:                   -Continue medical regimen as prescribed to prevent chest pain.                   -Continue dual anti-platelet, b-blocker,Ace, statin, PPI                   -Instructed to call 911 if chest pain, shortness of breath or bleeding from access site                     -No heavy lifting of >10 lbs x 1 week,                    - No driving x 24 hrs                   - No baths, swimming pools x 1 week,  may shower                   -Low sodium low fat low cholesterol diet                   - Follow up with Cardiologist in 1-2 weeks after discharge                                    Cardiology Follow up s/p PCI    DENISE BILLINGSLEY   67y Female  PAST MEDICAL & SURGICAL HISTORY:  Hypertension      CAD (coronary artery disease)      Cervical herniated disc      History of cholecystectomy      Coronary artery disease with cardiac symptoms      H/O heart artery stent           HPI:    Allergies    penicillin (Hives)  Percodan (Hives)  Vibramycin (Hives; Rash)    Intolerances      Patient without complaints. Pt ambulated without issues/symptoms  Denies CP, SOB, palpitations, or dizziness      Vital Signs Last 24 Hrs  T(C): --  T(F): --  HR: --  BP: --  BP(mean): --  RR: --  SpO2: --        MEDICATIONS  (STANDING):    MEDICATIONS  (PRN):      REVIEW OF SYSTEMS:          All negative except as mentioned in HPI    PHYSICAL EXAM:           CONSTITUTIONAL: Well-developed; well-nourished; in no acute distress  	SKIN: warm, dry  	HEAD: Normocephalic; atraumatic  	EYES: PERRL.  	ENT: No nasal discharge, airway clear, mucous membranes moist  	NECK: Supple; non tender.  	CARD: +S1, +S2, no murmurs, gallops, or rubs. Regular rate and rhythm    	RESP: No wheezes, rales or rhonchi. CTA B/L  	ABD: soft ntnd, + BS x 4 quadrants  	EXT: moves all extremities,  no clubbing, cyanosis or edema  	NEURO: Alert and oriented x3, no focal deficits          PSYCH: Cooperative, appropriate          VASCULAR:  + Rad / + PTs / + DPs          EXTREMITY:             	   Right Radial: Dressing D/C/I, pressure dressing removed, access site soft, no hematoma, no pain, + pulses, no sign of infection, no numbness            ECG:           P                                                                                                      2D ECHO:    LABS:          P              14.8   8.38  )-----------( 222      ( 10 Apr 2023 07:25 )             42.9     04-10    141  |  103  |  14  ----------------------------<  88  4.5   |  26  |  0.8    Ca    10.3      10 Apr 2023 07:25              A/P:  I discussed the case with Cardiologist Dr. Youssef  and recommend the following:    S/P PCI:                    Intervention:  Successful PCI of  mRCA with balloon angioplasty s/p SWATHI x1    Implants:       SYNERGY 3.0X38MM to mRCA                  AUC: 7     FINDINGS:   Coronary Dominance: right  LM: mild disease  LAD: mild disease. patent stent   CX: mild disease  RCA: moderate diffuse disease. 80% mRCA stenosis    LVEDP:  22 mmHg     EF:  34% on TTE 3/2023     ESTIMATED BLOOD LOSS: < 10 mL   	     Continue DAPT ( Aspirin 81 mg PO Daily and  Plavix 75 mg daily ),  B-Blocker(pt maintained on current twice daily dosing regimen as she refused to change to extended release today, pt will reconsider this option and discuss with Dr Youssef outpatient), Statin Therapy, Script to pharmacy for PPI                   patient aware and agreeable, Rx available for pickup                   Patient given 30 day supply of ( Aspirin 81 mg daily and Plavix 75 mg daily ) to take at home                   CBC @ 1330 hrs                   EKG prior to d/c @ 1500 hrs                   NS @ 150 ml/hr x 6hrs                   Monitor access site                   Patient agreeing to take DAPT for at least one year or as directed by cardiologist                    Pt given instructions on importance of taking antiplatelet medication or risk acute stent thrombosis/death                   Post cath instructions, access site care and activity restrictions reviewed with patient                     Discussed with patient to return to hospital if experience chest pain, shortness breath, dizziness and site bleeding                   Aggressive risk factor modification, diet counseling, smoking cessation discussed with patient                    Cardiac rehab info provided/referral and communication to cardiac rehab provided                      Can discharge patient @1500 hrs from cardiac standpoint after ambulating without symptoms, access site wnl, labs and ECG reviewed                    Follow up with Cardiology Dr. Youssef in two weeks.  Instructed to call and make an appointment                                         Discharge Instructions as follows:                   -Continue medical regimen as prescribed to prevent chest pain.                   -Continue dual anti-platelet, b-blocker,Ace, statin, PPI                   -Instructed to call 911 if chest pain, shortness of breath or bleeding from access site                     -No heavy lifting of >10 lbs x 1 week,                    - No driving x 24 hrs                   - No baths, swimming pools x 1 week,  may shower                   -Low sodium low fat low cholesterol diet                   - Follow up with Cardiologist in 1-2 weeks after discharge                                    Cardiology Follow up s/p PCI Staged     DENISE BILLINGSLEY   67y Female  PAST MEDICAL & SURGICAL HISTORY:  Hypertension      CAD (coronary artery disease)      Cervical herniated disc      History of cholecystectomy      Coronary artery disease with cardiac symptoms      H/O heart artery stent           HPI:    Allergies    penicillin (Hives)  Percodan (Hives)  Vibramycin (Hives; Rash)    Intolerances      Patient without complaints. Pt ambulated without issues/symptoms  Denies CP, SOB, palpitations, or dizziness      Vital Signs Last 24 Hrs  T(C): --  T(F): --  HR: 83   BP: 127/51  BP(mean): --  RR: 18  SpO2: --        MEDICATIONS  (STANDING):    MEDICATIONS  (PRN):      REVIEW OF SYSTEMS:          All negative except as mentioned in HPI    PHYSICAL EXAM:           CONSTITUTIONAL: Well-developed; well-nourished; in no acute distress  	SKIN: warm, dry  	HEAD: Normocephalic; atraumatic  	EYES: PERRL.  	ENT: No nasal discharge, airway clear, mucous membranes moist  	NECK: Supple; non tender.  	CARD: +S1, +S2, no murmurs, gallops, or rubs. Regular rate and rhythm    	RESP: No wheezes, rales or rhonchi. CTA B/L  	ABD: soft ntnd, + BS x 4 quadrants  	EXT: moves all extremities,  no clubbing, cyanosis or edema  	NEURO: Alert and oriented x3, no focal deficits          PSYCH: Cooperative, appropriate          VASCULAR:  + Rad / + PTs / + DPs          EXTREMITY:             	   Right Radial: Dressing D/C/I, access site soft, no hematoma, no pain, + pulses, no sign of infection, no numbness            ECG:           P                                                                                                      2D ECHO:    LABS:          P              14.8   8.38  )-----------( 222      ( 10 Apr 2023 07:25 )             42.9     04-10    141  |  103  |  14  ----------------------------<  88  4.5   |  26  |  0.8    Ca    10.3      10 Apr 2023 07:25              A/P:  I discussed the case with Cardiologist Dr. Youssef  and recommend the following:    S/P PCI:                    Intervention:  Successful PCI of  mRCA with balloon angioplasty s/p SWATHI x1    Implants:       SYNERGY 3.0X38MM to mRCA                  AUC: 7     FINDINGS:   Coronary Dominance: right  LM: mild disease  LAD: mild disease. patent stent   CX: mild disease  RCA: moderate diffuse disease. 80% mRCA stenosis    LVEDP:  22 mmHg     EF:  34% on TTE 3/2023     ESTIMATED BLOOD LOSS: < 10 mL   	     Continue DAPT ( Aspirin 81 mg PO Daily and  Plavix 75 mg daily ),  B-Blocker(pt maintained on current twice daily dosing regimen as she refused to change to extended release today, pt will reconsider this option and discuss with Dr Youssef outpatient), Ace/ARB,Statin Therapy, Script to pharmacy for PPI                   patient aware and agreeable, Rx available for pickup                   Patient given 30 day supply of ( Aspirin 81 mg daily and Plavix 75 mg daily ) to take at home                   CBC @ 1330 hrs                   EKG prior to d/c @ 1500 hrs                   NS @ 150 ml/hr x 6hrs                   Monitor access site                   Patient agreeing to take DAPT for at least one year or as directed by cardiologist                    Pt given instructions on importance of taking antiplatelet medication or risk acute stent thrombosis/death                   Post cath instructions, access site care and activity restrictions reviewed with patient                     Discussed with patient to return to hospital if experience chest pain, shortness breath, dizziness and site bleeding                   Aggressive risk factor modification, diet counseling, smoking cessation discussed with patient                    Cardiac rehab info provided/referral and communication to cardiac rehab provided                      Can discharge patient @1500 hrs from cardiac standpoint after ambulating without symptoms, access site wnl, labs and ECG reviewed                    Follow up with Cardiology Dr. Youssef in two weeks.  Instructed to call and make an appointment                                         Discharge Instructions as follows:                   -Continue medical regimen as prescribed to prevent chest pain.                   -Continue dual anti-platelet, b-blocker,Ace, statin, PPI                   -Instructed to call 911 if chest pain, shortness of breath or bleeding from access site                     -No heavy lifting of >10 lbs x 1 week,                    - No driving x 24 hrs                   - No baths, swimming pools x 1 week,  may shower                   -Low sodium low fat low cholesterol diet                   - Follow up with Cardiologist in 1-2 weeks after discharge                                    Cardiology Follow up s/p PCI Staged     DENISE BILLINGSLEY   67y Female  PAST MEDICAL & SURGICAL HISTORY:  Hypertension      CAD (coronary artery disease)      Cervical herniated disc      History of cholecystectomy      Coronary artery disease with cardiac symptoms      H/O heart artery stent           HPI:    Allergies    penicillin (Hives)  Percodan (Hives)  Vibramycin (Hives; Rash)    Intolerances      Patient without complaints. Pt ambulated without issues/symptoms  Denies CP, SOB, palpitations, or dizziness      Vital Signs Last 24 Hrs  T(C): --  T(F): --  HR: 83   BP: 127/51  BP(mean): --  RR: 18  SpO2: --        MEDICATIONS  (STANDING):    MEDICATIONS  (PRN):      REVIEW OF SYSTEMS:          All negative except as mentioned in HPI    PHYSICAL EXAM:           CONSTITUTIONAL: Well-developed; well-nourished; in no acute distress  	SKIN: warm, dry  	HEAD: Normocephalic; atraumatic  	EYES: PERRL.  	ENT: No nasal discharge, airway clear, mucous membranes moist  	NECK: Supple; non tender.  	CARD: +S1, +S2, no murmurs, gallops, or rubs. Regular rate and rhythm    	RESP: No wheezes, rales or rhonchi. CTA B/L  	ABD: soft ntnd, + BS x 4 quadrants  	EXT: moves all extremities,  no clubbing, cyanosis or edema  	NEURO: Alert and oriented x3, no focal deficits          PSYCH: Cooperative, appropriate          VASCULAR:  + Rad / + PTs / + DPs          EXTREMITY:             	   Right Radial: Dressing D/C/I, access site soft, no hematoma, no pain, + pulses, no sign of infection, no numbness            ECG:           P                                                                                                      2D ECHO:    LABS:          P              14.8   8.38  )-----------( 222      ( 10 Apr 2023 07:25 )             42.9     04-10    141  |  103  |  14  ----------------------------<  88  4.5   |  26  |  0.8    Ca    10.3      10 Apr 2023 07:25              A/P:  I discussed the case with Cardiologist Dr. Youssef  and recommend the following:    S/P PCI:                    Intervention:  Successful PCI of  mRCA with balloon angioplasty s/p SWATHI x1    Implants:       SYNERGY 3.0X38MM to mRCA                  AUC: 7     FINDINGS:   Coronary Dominance: right  LM: mild disease  LAD: mild disease. patent stent   CX: mild disease  RCA: moderate diffuse disease. 80% mRCA stenosis    LVEDP:  22 mmHg     EF:  34% on TTE 3/2023     ESTIMATED BLOOD LOSS: < 10 mL   	     Continue DAPT ( Aspirin 81 mg PO Daily and  Plavix 75 mg daily ),  B-Blocker(pt maintained on current twice daily dosing regimen as she refused to change to extended release today, pt will reconsider this option and discuss with Dr Youssef outpatient), Ace/ARB,Statin Therapy, Script to pharmacy for PPI                   patient aware and agreeable, Rx available for pickup                   Patient given 30 day supply of ( Aspirin 81 mg daily and Plavix 75 mg daily ) to take at home                   CBC @ 1330 hrs                   EKG prior to d/c @ 1500 hrs                   NS @ 150 ml/hr x 6hrs                   Monitor access site                   Patient agreeing to take DAPT for at least one year or as directed by cardiologist                    Pt given instructions on importance of taking antiplatelet medication or risk acute stent thrombosis/death                   Post cath instructions, access site care and activity restrictions reviewed with patient                     Discussed with patient to return to hospital if experience chest pain, shortness breath, dizziness and site bleeding                   Aggressive risk factor modification, diet counseling, smoking cessation discussed with patient                    Cardiac rehab info provided/referral and communication to cardiac rehab provided                      Can discharge patient @1500 hrs from cardiac standpoint after ambulating without symptoms, access site wnl, labs and ECG reviewed                    Follow up with Cardiology Dr. Caal in two weeks.  Instructed to call and make an appointment                                         Discharge Instructions as follows:                   -Continue medical regimen as prescribed to prevent chest pain.                   -Continue dual anti-platelet, b-blocker,Ace, statin, PPI                   -Instructed to call 911 if chest pain, shortness of breath or bleeding from access site                     -No heavy lifting of >10 lbs x 1 week,                    - No driving x 24 hrs                   - No baths, swimming pools x 1 week,  may shower                   -Low sodium low fat low cholesterol diet                   - Follow up with Cardiologist in 1-2 weeks after discharge

## 2023-04-10 NOTE — ASU PATIENT PROFILE, ADULT - FALL HARM RISK - FALLEN IN PAST
----- Message from Polo Mercedes, [address] [place] sent at 7/9/2021  1:01 PM EDT -----  Subject: Message to Provider    QUESTIONS  Information for Provider? Pt has appointment on Monday 12th at 8:30 am. Pt   is calling in about his right ear, pt states that he believes it needs to   be cleaned out. Pt would like to come in today to have it cleaned. If he   can't get into today he would like the ear cleaned on Monday. Pt can not   do anything after 4 pm  ---------------------------------------------------------------------------  --------------  CALL BACK INFO  What is the best way for the office to contact you? OK to leave message on   voicemail  Preferred Call Back Phone Number? [phone]  ---------------------------------------------------------------------------  --------------  SCRIPT ANSWERS  Relationship to Patient? Self  Appointment reason? Symptomatic  Select script based on patient symptoms? Adult Ear/Hearing Problem  Did you have an injury or trauma within the past three days? No  Is your pain affecting your daily activities? No  Are you having fevers (100.4), chills or sweats? No  Are you experiencing new onset hearing loss? No  Did your symptoms begin within the last 2 weeks? No  Have your symptoms been worsening over the past 1-2 days? No  (Is the patient requesting to be seen urgently for their symptoms?)? No  Have you previously seen a provider for this issue?  Yes No

## 2023-04-10 NOTE — CHART NOTE - NSCHARTNOTEFT_GEN_A_CORE
PRE-OP DIAGNOSIS:  staged PCI of RCA    PROCEDURE:   [x ] Coronary Angiogram   [x ] LHC   [ ] LVG   [ ] RHC   [x ] Intervention (see below)       PHYSICIAN:  Dr. Youssef  INTERVENTIONAL FELLOW: Dr. Harley  FELLOW: Mohamud    PROCEDURE DESCRIPTION:     Consent:    [x] Patient   [] Family Member   []  Used      Anesthesia:   [ ] General   [X] Sedation   [X] Local     Access & Closure:   [ x]  6 Fr R  Radial Artery  -> TR Band     IV Contrast: 85 mL      Intervention:  Successful PCI of  mRCA with balloon angioplasty s/p SAWTHI x1    Implants:       SYNERGY 3.0X38MM to mRCA                  AUC: 7     FINDINGS:   Coronary Dominance: right  LM: mild disease  LAD: mild disease. patent stent   CX: mild disease  RCA: moderate diffuse disease. 95% mRCA stenosis    LVEDP:  22 mmHg     EF:  34% on TTE 3/2023     ESTIMATED BLOOD LOSS: < 10 mL      CONDITION:   [x] Good   [ ] Fair   [ ] Critical     SPECIMEN REMOVED: N/A     POST-OP DIAGNOSIS:    [ x] 1- Vessel Coronary Artery Disease      PLAN OF CARE:   [x ] D/C Home Today   [ ] Return to In-patient bed   [ ] Admit for observation   [ ] Return for Staged Procedure in 4-6 weeks   [ ] CT Surgery Consult   [X] Medications: ASA,  statin, plavix  [X] IV Fluids: NS @ 100cc/h x 6 hours  [ ] EP Consult  [x] Remove TR band and Hold manual pressure if signs of hematoma or bleeding over radial access site.  [x] Smoking cessation PRE-OP DIAGNOSIS:  staged PCI of RCA    PROCEDURE:   [x ] Coronary Angiogram   [x ] LHC   [ ] LVG   [ ] RHC   [x ] Intervention (see below)       PHYSICIAN:  Dr. Youssef  INTERVENTIONAL FELLOW: Dr. Harley  FELLOW: Mohamud    PROCEDURE DESCRIPTION:     Consent:    [x] Patient   [] Family Member   []  Used      Anesthesia:   [ ] General   [X] Sedation   [X] Local     Access & Closure:   [ x]  6 Fr R  Radial Artery  -> TR Band     IV Contrast: 85 mL      Intervention:  Successful PCI of  mRCA with balloon angioplasty s/p SWATHI x1    Implants:       SYNERGY 3.0X38MM to mRCA                  AUC: 7     FINDINGS:   Coronary Dominance: right  LM: mild disease  LAD: mild disease. patent stent   CX: mild disease  RCA: moderate diffuse disease. 80% mRCA stenosis    LVEDP:  22 mmHg     EF:  34% on TTE 3/2023     ESTIMATED BLOOD LOSS: < 10 mL      CONDITION:   [x] Good   [ ] Fair   [ ] Critical     SPECIMEN REMOVED: N/A     POST-OP DIAGNOSIS:    [ x] 1- Vessel Coronary Artery Disease      PLAN OF CARE:   [x ] D/C Home Today   [ ] Return to In-patient bed   [ ] Admit for observation   [ ] Return for Staged Procedure in 4-6 weeks   [ ] CT Surgery Consult   [X] Medications: ASA,  statin, plavix  [X] IV Fluids: NS @ 100cc/h x 6 hours  [ ] EP Consult  [x] Remove TR band and Hold manual pressure if signs of hematoma or bleeding over radial access site.  [x] Smoking cessation PRE-OP DIAGNOSIS:  staged PCI of RCA    PROCEDURE:   [x ] Coronary Angiogram   [x ] LHC   [ ] LVG   [ ] RHC   [x ] Intervention (see below)       PHYSICIAN:  Dr. Youssef  INTERVENTIONAL FELLOW: Dr. Harley  FELLOW: Mohamud    PROCEDURE DESCRIPTION:     Consent:    [x] Patient   [] Family Member   []  Used      Anesthesia:   [ ] General   [X] Sedation   [X] Local     Access & Closure:   [ x]  6 Fr R  Radial Artery  -> TR Band     IV Contrast: 85 mL      Intervention:  Successful PCI of  mRCA with balloon angioplasty s/p SWATHI x1    Implants:       SYNERGY 3.0X38MM to mRCA                  AUC: 7     FINDINGS:   Coronary Dominance: right  LM: mild disease  LAD: mild disease. patent stent   CX: mild disease  RCA: moderate diffuse disease. 80% mRCA stenosis    LVEDP:  22 mmHg     EF:  34% on TTE 3/2023     ESTIMATED BLOOD LOSS: < 10 mL      CONDITION:   [x] Good   [ ] Fair   [ ] Critical     SPECIMEN REMOVED: N/A     POST-OP DIAGNOSIS:    [ x] 1- Vessel Coronary Artery Disease      PLAN OF CARE:   [x ] D/C Home Today   [ ] Return to In-patient bed   [ ] Admit for observation   [ ] Return for Staged Procedure in 4-6 weeks   [ ] CT Surgery Consult   [X] Medications: ASA,  statin, plavix  [X] IV Fluids: NS @ 150cc/h x 6 hours  [ ] EP Consult  [x] Remove TR band and Hold manual pressure if signs of hematoma or bleeding over radial access site.  [ ] Smoking cessation

## 2023-04-10 NOTE — ASU PATIENT PROFILE, ADULT - FALL HARM RISK - HARM RISK INTERVENTIONS
Communicate Risk of Fall with Harm to all staff/Reinforce activity limits and safety measures with patient and family/Tailored Fall Risk Interventions/Use of alarms - bed, chair and/or voice tab/Visual Cue: Yellow wristband and red socks/Bed in lowest position, wheels locked, appropriate side rails in place/Call bell, personal items and telephone in reach/Instruct patient to call for assistance before getting out of bed or chair/Non-slip footwear when patient is out of bed/[place] to call system/Physically safe environment - no spills, clutter or unnecessary equipment/Purposeful Proactive Rounding/Room/bathroom lighting operational, light cord in reach

## 2023-04-25 ENCOUNTER — APPOINTMENT (OUTPATIENT)
Dept: ORTHOPEDIC SURGERY | Facility: CLINIC | Age: 68
End: 2023-04-25
Payer: MEDICARE

## 2023-04-25 PROCEDURE — 99213 OFFICE O/P EST LOW 20 MIN: CPT

## 2023-04-25 NOTE — REASON FOR VISIT
[FreeTextEntry2] : neck, back and right shoulder pain\par Did have 2 stents by Cardiology feeling better is thinking about another injection by pain management in the neck now on Plavix
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
## 2023-04-25 NOTE — HISTORY OF PRESENT ILLNESS
[de-identified] : Chief Complaints\par Patient Complaint - Pain posterior and on right side of her neck and shoulder also pain in her low back\par Now retired reports still difficulty turning her neck and pain in the back is still present she still takes the medication with\par good effectiveness continues to see pain management had RFA in the cervical spine in May really did  help \par did get a recent cervical MRI 06/11/2021 does have follow-up with pain management thinks it is time to get another\par injection

## 2023-04-25 NOTE — ASSESSMENT
[FreeTextEntry1] :  continue pain management may be reasonable to consider cervical or lumbar injection she did not need medications refilled to today's visit I will see her in 4 months

## 2023-05-03 ENCOUNTER — APPOINTMENT (OUTPATIENT)
Dept: CARDIOLOGY | Facility: CLINIC | Age: 68
End: 2023-05-03
Payer: MEDICARE

## 2023-05-03 VITALS
DIASTOLIC BLOOD PRESSURE: 80 MMHG | BODY MASS INDEX: 24.96 KG/M2 | SYSTOLIC BLOOD PRESSURE: 130 MMHG | WEIGHT: 169 LBS | HEART RATE: 92 BPM

## 2023-05-03 DIAGNOSIS — Z87.891 PERSONAL HISTORY OF NICOTINE DEPENDENCE: ICD-10-CM

## 2023-05-03 PROCEDURE — 93000 ELECTROCARDIOGRAM COMPLETE: CPT

## 2023-05-03 PROCEDURE — 99214 OFFICE O/P EST MOD 30 MIN: CPT

## 2023-05-03 RX ORDER — HYDROCODONE BITARTRATE AND ACETAMINOPHEN 7.5; 325 MG/1; MG/1
7.5-325 TABLET ORAL
Qty: 90 | Refills: 0 | Status: DISCONTINUED | COMMUNITY
Start: 2022-08-30 | End: 2023-05-03

## 2023-05-03 RX ORDER — METHOCARBAMOL 500 MG/1
500 TABLET, FILM COATED ORAL 3 TIMES DAILY
Qty: 90 | Refills: 1 | Status: DISCONTINUED | COMMUNITY
Start: 2022-08-29 | End: 2023-05-03

## 2023-05-03 RX ORDER — METHOCARBAMOL 500 MG/1
500 TABLET, FILM COATED ORAL 3 TIMES DAILY
Qty: 90 | Refills: 0 | Status: DISCONTINUED | COMMUNITY
Start: 2022-07-29 | End: 2023-05-03

## 2023-05-03 NOTE — DISCUSSION/SUMMARY
[FreeTextEntry1] : TTE in 3 months to reevaluate LVEF\par Continue DAPT with aspirin and Plavix\par Continue Rosuvastatin\par Continue Metoprolol and Lisinopril\par Follow up 3 months

## 2023-05-11 ENCOUNTER — APPOINTMENT (OUTPATIENT)
Dept: PAIN MANAGEMENT | Facility: CLINIC | Age: 68
End: 2023-05-11

## 2023-06-01 ENCOUNTER — APPOINTMENT (OUTPATIENT)
Dept: PAIN MANAGEMENT | Facility: CLINIC | Age: 68
End: 2023-06-01
Payer: MEDICARE

## 2023-06-01 VITALS — WEIGHT: 169 LBS | HEIGHT: 69 IN | BODY MASS INDEX: 25.03 KG/M2

## 2023-06-01 PROCEDURE — 99214 OFFICE O/P EST MOD 30 MIN: CPT

## 2023-06-01 NOTE — ASSESSMENT
[FreeTextEntry1] : 67 year old female presenting with neck pain. Patient is presenting with mainly axial pain with impairment in ADLs and functionality pointing to facet joints.  The pain has not responded to conservative care, including medications, stretching, as well as active modalities, such as physical therapy.  Imaging studies as well as physical exam findings corroborate the symptomatology and point to the facet joints.  We will proceed with diagnostic medial branch blocks. Follow up in 6 weeks will be made for reassessment. I have explained the findings to the patient and all questions have been answered. \par \par Patient had paravertebral tenderness and pain on spinal movement with facet loading. Patient has trialed rehab (Home exercise, physical therapy or chiropractic care) and medications.  Schedule a RIGHT diagnostic cervical medial branch injection C3-C6, if 70% immediate relief for greater than 30 minutes or greater than 50% for 24 hours, would schedule RFA at cervical medial branches next, if greater than 50% intermediate relief for 30 minutes, would schedule a second diagnostic cervical medial branch block, and if greater than 50% intermediate relief for 30 minutes, would schedule a RFA at medial branches. \par \par Risk, benefits, pros and cons of procedure were explained to the patient using models and diagrams and their questions were answered. \par \par \par The patient has severe anxiety of procedures that necessitates monitored anesthesia care (MAC). The procedure performed will be close to major nerves, arteries, and spinal cord and/or joint structures. Due to the proximity of these structures, we need the patient to be still during the procedure.  With the help of MAC, this will be safely achieved and decrease the risk of any complications.\par \par \par Entered by Bebe Live, acting as scribe for Dr. Harvey.\par  \par The documentation recorded by the scribe, in my presence, accurately reflects the service I personally performed, and the decisions made by me with my edits as appropriate.\par  \par Best Regards, \par Fermin Harvey MD \par Board Certified, Anesthesiology \par Board Certified, Pain Medicine\par  \par \par
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
## 2023-06-01 NOTE — PHYSICAL EXAM
[de-identified] : NECK - tenderness into the cervical paraspinals. ROM restricted. Pain with extension. Positive facet loading bilaterally.

## 2023-06-14 ENCOUNTER — APPOINTMENT (OUTPATIENT)
Dept: PAIN MANAGEMENT | Facility: CLINIC | Age: 68
End: 2023-06-14
Payer: MEDICARE

## 2023-06-14 PROCEDURE — 93770 DETERMINATION VENOUS PRESS: CPT

## 2023-06-14 PROCEDURE — 64490 INJ PARAVERT F JNT C/T 1 LEV: CPT | Mod: RT

## 2023-06-14 PROCEDURE — 64491 INJ PARAVERT F JNT C/T 2 LEV: CPT | Mod: RT

## 2023-06-14 PROCEDURE — 94761 N-INVAS EAR/PLS OXIMETRY MLT: CPT

## 2023-06-14 PROCEDURE — 64492 INJ PARAVERT F JNT C/T 3 LEV: CPT | Mod: RT

## 2023-06-14 PROCEDURE — 93040 RHYTHM ECG WITH REPORT: CPT | Mod: 79

## 2023-06-14 PROCEDURE — 00600 ANES PX CRV SPINE&CORD NOS: CPT | Mod: QZ,P2

## 2023-06-14 NOTE — PROCEDURE
[FreeTextEntry1] : MEDIAL BRANCH NERVE BLOCK- CERVICAL LEVELS C3-C6 [FreeTextEntry3] : MEDIAL BRANCH NERVE BLOCK- CERVICAL LEVELS C3-C6\par \par \par Date:  2023\par \par Patient: Phyllis Walker\par \par :  1955\par \par \par Preoperative Diagnosis: Right Cervical facet arthropathy C3- C6\par Preoperative Diagnosis: Right Cervical facet arthropathy C3- C6\par Procedure: Diagnostic medial branch nerve block of Right  C3-C6  under fluoroscopy\par Physician: Fermin Harvey M.D. \par Anesthesiologist/CRNA: Ms. Garcia  \par Anesthesia: See nurses note, MAC/cold spray IV Sedation versed 6mg, fentanyl 100mcg \par Medical Necessity: Facet arthropathy; intractable axial pain amenable only to medial branch nerve injections. Criteria met for a medial branch nerve neurotomy.\par Indication for Fluoroscopy:  This procedure requires the precise placement of the spinal needle into the epidural space.  It is the only way to accurately and safely perform the injection.\par \par \par Consent:  Though unusual, the possible complications including infection, bleeding, nerve damage, hospital admission, stroke, pneumothorax, death or failure of the procedure are theoretically possible. The patient was educated about the of the procedure and alternative therapies. All questions were answered and the patient freely gave consent to proceed.\par Prior to the procedure, we discussed the risks of the radiofrequency such as hematoma, infection, and nerve or spinal cord injury as well as dropped head syndrome. The patient was also told that sometimes patients will notice upper and/or lower extremity weakness immediately following the procedure due to extravasation of local anesthetic solution onto the main nerve root during the procedure. In addition, the patient was informed of other possible complications such as phrenic nerve injury, weak/heavy head, or muscle injury. \par \par \par Monitoring:  Patient had continuous blood pressure, EKG, and pulse oximetry throughout the case. See nurse's notes.\par \par \par \par PROCEDURE NOTE: \par Routine OR consent was signed and procedure risks reviewed with the patient.  The patient was positioned prone on the operating table.  A chloraprep was performed and the area surrounded by sterile drapes.  A time out was performed.  Fluoroscopy unit was positioned over the patient and images of the spine (AP views) obtained.  The entry sites for the above noted levels median branch were determined and local anesthesia with lidocaine 2%-1cc was provided.  At each level a 22guage 3 ½ inch spinal needle was placed at the level of the median branch to the facet under fluoroscopic guidance. This was performed by determining needle position based on aligning the needle point with the waist of each cervical vertebrate under tunnel vision.  A dose of lidocaine 2%, 1cc \par was administered at each level.  The needles at each level were withdrawn following administration of the medication.  There was no significant bleeding at the site.  A dressing was placed by the assistant.\par \par \par \par Findings: Cervical spine AP and oblique views with x-ray degenerative changes noted.\par \par \par Complications: none. \par \par Disposition: I have examined the patient and there are no new physical findings since the original presentation.  Sensory and motor function were intact. The patient met discharge criteria see nurses notes. The discharge instruction sheet was reviewed and given to the patient. The patient was discharged home with a .\par \par Comment: Immediate relief today: 90%    If at least 70% relief or 50% greater than 24 hours,  would proceed to RFA. If 50% relief is less than 24 hours, would repeat to confirm for RFA. If less than 50% relief, assess for other pain generators. Call if any problems\par \par Indication for RFA: The pt had a positive response to medial branch diagnostic injections and is considered a good candidate for the thermal RF ablation procedure. The diagnostic injection provided at least 50% reduction in pain for the duration of the local anesthetic.\par The following criteria have been met: 1) failed response to at least 3 months of conservative management; 2) patient has neck pain that is non-radicular, suggesting facet joint origin supported by absence of nerve root compression documented on the medical record on H&P and radiographic evaluation; 3) minimum of 6 months has elapsed since any prior RF treatments. If prior ablation therapy has been performed, it provided at least 50% relief for minimum of 10-12 weeks; 4) no prior spinal fusion at the vertebral level being treated;\par \par \par \par This document was signed by:\par \par Fermin Harvey MD \par Board Certified, Anesthesiology \par Board Certified, Pain Medicine \par \par

## 2023-07-13 ENCOUNTER — APPOINTMENT (OUTPATIENT)
Dept: PAIN MANAGEMENT | Facility: CLINIC | Age: 68
End: 2023-07-13
Payer: MEDICARE

## 2023-07-13 PROCEDURE — 94761 N-INVAS EAR/PLS OXIMETRY MLT: CPT

## 2023-07-13 PROCEDURE — 93040 RHYTHM ECG WITH REPORT: CPT | Mod: 59

## 2023-07-13 PROCEDURE — 93770 DETERMINATION VENOUS PRESS: CPT

## 2023-07-13 PROCEDURE — 72040 X-RAY EXAM NECK SPINE 2-3 VW: CPT

## 2023-07-13 PROCEDURE — 64634Z: CUSTOM | Mod: RT

## 2023-07-13 PROCEDURE — 64633 DESTROY CERV/THOR FACET JNT: CPT | Mod: RT

## 2023-07-13 PROCEDURE — 00600 ANES PX CRV SPINE&CORD NOS: CPT | Mod: QZ,P3

## 2023-07-13 NOTE — PROCEDURE
[FreeTextEntry1] : MEDIAL BRANCH NERVE RHIZOTOMY- CERVICAL LEVELS C3-C6 [FreeTextEntry3] : MEDIAL BRANCH NERVE RHIZOTOMY- CERVICAL LEVELS C3-C6\par \par \par Patient Type: Established\par Encounter Type: Continuing Active.\par \par \par Preoperative Diagnosis: Right Cervical facet arthropathy C3- C6\par Preoperative Diagnosis: Right Cervical facet arthropathy C3- C6\par Procedure: Neurotomy of the medial branch nerve of Right C3-C6 under fluoroscopy\par Physician: Fermin Harvey M.D. \par Anesthesiologist/CRNA: Mr. Yi \par Anesthesia: See nurses note/MAC/Cold Spray/ Versed 4mg, Fentanyl 150mcg\par \par \par Medical Necessity: Facet arthropathy; intractable axial pain amenable only to medial branch nerve injections. Criteria met for a medial branch nerve neurotomy.\par Indication for Fluoroscopy:  This procedure requires the precise placement of the spinal needle into the epidural space.  It is the only way to accurately and safely perform the injection.\par \par \par Consent:  Though unusual, the possible complications including infection, bleeding, nerve damage, hospital admission, stroke, pneumothorax, death or failure of the procedure are theoretically possible. The patient was educated about the of the procedure and alternative therapies. All questions were answered and the patient freely gave consent to proceed.\par Prior to the procedure, we discussed the risks of the radiofrequency such as hematoma, infection, and nerve or spinal cord injury as well as dropped head syndrome. The patient was also told that sometimes patients will notice upper and/or lower extremity weakness immediately following the procedure due to extravasation of local anesthetic solution onto the main nerve root during the procedure. In addition, the patient was informed of other possible complications such as phrenic nerve injury, weak/heavy head, or muscle injury.  Lastly, the patient was informed that 1 to 2 weeks of perioperative discomfort following the procedure is to be expected.\par \par \par Monitoring:  Patient had continuous blood pressure, EKG, and pulse oxime\par \par try throughout the case. See nurse's notes.\par \par \par \par PROCEDURE NOTE:\par \par The patient was placed in a prone position on a fluoroscopy table.  The back was prepped and draped in a sterile fashion and a C-arm fluoroscopic device was used for localization of the radiofrequency target sites. A time out was performed.  The NephroPlus  mm disposable Paras*-coated cannula with a 10 mm active tip was adjusted via the sizing collar so that the electrode fit just inside the inner bevel at the end of the bare metal. Prior to beginning the procedure, the internal test mode function of the radiofrequency unit was checked to make sure the machine was functioning properly. The initial target zones included the waists of the vertebral bodies. At each target site, a 1% Lidocaine solution was used to anesthetize the skin and subcutaneous tissues and the radiofrequency cannulas were placed in a parallel fashion to the x-ray beam and directed in a bulls-eye fashion down to the target zones. The cannula was then walked over the leading edge and advanced approximately 2 to 3 mm in an anterior direction. Sensory stimulation at 50 hertz was performed at each target area. Referral of the sensory stimulation was noted at less than 1 volt over the paravertebral area. Motor dissociation at 2 hertz was obtained by stimulating up to 3 times the voltage of the sensory stimulation and insuring a lack of motor movement in the upper extremities. Once the radiofrequency cannula were in correct position, a 2% Lidocaine solution was used to rapidly anesthetize the lesion site. After a thirty second delay, thermal lesions were then created at each level for 60 seconds at a temperature of 80¿ C. After the lesions were created, the cannulas were removed and sterile bandages are placed at the puncture sites. The patient tolerated the procedure well. \par \par \par Findings: Cervical spine AP and oblique views with x-ray degenerative changes noted.\par \par \par \par Complications: none. \par \par \par \par Disposition: I have examined the patient and there are no new physical findings since the original presentation.  Sensory and motor function were intact. The patient met discharge criteria see nurses notes. The discharge instruction sheet was reviewed and given to the patient. The patient was discharged home with a .\par \par \par \par Comment: RFA today, follow up in 2-4 weeks. Call if any problem.\par \par \par Fermin Harvey MD \par Board Certified, Anesthesiology \par Board Certified, Pain Medicine\par \par

## 2023-08-08 ENCOUNTER — APPOINTMENT (OUTPATIENT)
Dept: ORTHOPEDIC SURGERY | Facility: CLINIC | Age: 68
End: 2023-08-08
Payer: MEDICARE

## 2023-08-08 ENCOUNTER — APPOINTMENT (OUTPATIENT)
Dept: CARDIOLOGY | Facility: CLINIC | Age: 68
End: 2023-08-08
Payer: MEDICARE

## 2023-08-08 VITALS
SYSTOLIC BLOOD PRESSURE: 142 MMHG | WEIGHT: 161 LBS | BODY MASS INDEX: 23.78 KG/M2 | HEART RATE: 75 BPM | DIASTOLIC BLOOD PRESSURE: 80 MMHG

## 2023-08-08 PROCEDURE — 93000 ELECTROCARDIOGRAM COMPLETE: CPT

## 2023-08-08 PROCEDURE — 99214 OFFICE O/P EST MOD 30 MIN: CPT

## 2023-08-08 PROCEDURE — 93306 TTE W/DOPPLER COMPLETE: CPT

## 2023-08-08 PROCEDURE — 99213 OFFICE O/P EST LOW 20 MIN: CPT

## 2023-08-08 RX ORDER — HYDROCODONE BITARTRATE AND ACETAMINOPHEN 7.5; 325 MG/1; MG/1
7.5-325 TABLET ORAL
Qty: 60 | Refills: 0 | Status: DISCONTINUED | COMMUNITY
Start: 2023-05-18 | End: 2023-08-08

## 2023-08-08 RX ORDER — LISINOPRIL 2.5 MG/1
2.5 TABLET ORAL DAILY
Qty: 90 | Refills: 1 | Status: DISCONTINUED | COMMUNITY
End: 2023-08-08

## 2023-08-08 RX ORDER — METHOCARBAMOL 500 MG/1
500 TABLET, FILM COATED ORAL 3 TIMES DAILY
Qty: 21 | Refills: 0 | Status: DISCONTINUED | COMMUNITY
Start: 2023-05-18 | End: 2023-08-08

## 2023-08-08 RX ORDER — HYDROCODONE BITARTRATE AND ACETAMINOPHEN 7.5; 325 MG/1; MG/1
7.5-325 TABLET ORAL
Qty: 90 | Refills: 0 | Status: DISCONTINUED | COMMUNITY
Start: 2023-06-06 | End: 2023-08-08

## 2023-08-08 RX ORDER — ROSUVASTATIN CALCIUM 10 MG/1
10 TABLET, FILM COATED ORAL
Qty: 90 | Refills: 1 | Status: DISCONTINUED | COMMUNITY
Start: 2023-03-07 | End: 2023-08-08

## 2023-08-08 NOTE — ASSESSMENT
[FreeTextEntry1] :  continue pain management may be reasonable to consider additional cervical or lumbar injections she did not need medications refilled to today's visit I will see her in 4 months

## 2023-08-08 NOTE — HISTORY OF PRESENT ILLNESS
[FreeTextEntry1] : 67 F with CAD s/p PCI pLAD on 3/1/2023, s/p PCI mRCA on 4/10/2023, HFimpEF 55%, anxiety.  Denies any CP, SOB, palpitations, orthopnea/PND, dizziness or syncope. Possible allergy to either Lisinopril or Rosuvastatin (hives).  TTE today reviewed - EF >55%, no valvular disease

## 2023-08-08 NOTE — REASON FOR VISIT
[FreeTextEntry2] : neck, back and right shoulder pain recently had an RFA in the neck to the right side it still little sore still on Plavix cardiac is stable remains on Robaxin 500 and hydrocodone does not need refills lost 7 pounds

## 2023-08-08 NOTE — HISTORY OF PRESENT ILLNESS
[de-identified] : Chief Complaints\par  Patient Complaint - Pain posterior and on right side of her neck and shoulder also pain in her low back\par  Now retired reports still difficulty turning her neck and pain in the back is still present she still takes the medication with\par  good effectiveness continues to see pain management had RFA in the cervical spine in May really did  help \par  did get a recent cervical MRI 06/11/2021 does have follow-up with pain management thinks it is time to get another\par  injection

## 2023-08-08 NOTE — IMAGING
[de-identified] : Neck: \par  Inspection of the cervical spine is as follows: She has pain in the back which is unbelievable she has guarded motion\par  in all planes tested with spasm pain is particularly limited limiting her rotation. No focal findings in the upper extremity\par  shoulder motion is full \par  Back, including spine: Inspection of the thoracic/lumbar spine is as follows: Back as generalized tightness no\par  punch tenderness mild spasm no postural scoliosis tight dorsal lumbar fashion hamstrings hip motion is good negative\par  bowstring bilaterally reflexes brisk and symmetric

## 2023-08-08 NOTE — ASSESSMENT
[FreeTextEntry1] : CAD s/p PCI pLAD on 3/1/2023, s/p PCI mRCA on 4/10/2023 / HFimpEF 35 to 55%   TTE reviewed Continue DAPT with aspirin and Plavix Increase Metoprolol tartrate to 50 mg BID Decrease Rosuvastatin to 5 mg - if allergic then discontinue Discontinue Lisinopril - possible allergy Follow up 4 months

## 2023-08-17 ENCOUNTER — APPOINTMENT (OUTPATIENT)
Dept: PAIN MANAGEMENT | Facility: CLINIC | Age: 68
End: 2023-08-17
Payer: MEDICARE

## 2023-08-17 VITALS
WEIGHT: 161 LBS | HEIGHT: 69 IN | HEART RATE: 82 BPM | BODY MASS INDEX: 23.85 KG/M2 | DIASTOLIC BLOOD PRESSURE: 81 MMHG | SYSTOLIC BLOOD PRESSURE: 123 MMHG

## 2023-08-17 DIAGNOSIS — G58.9 MONONEUROPATHY, UNSPECIFIED: ICD-10-CM

## 2023-08-17 PROCEDURE — 99214 OFFICE O/P EST MOD 30 MIN: CPT

## 2023-08-17 NOTE — PHYSICAL EXAM
[de-identified] : NECK - tenderness into the suprascapular and axillary nerves. Positive tinel over the suprascapular and axillary nerves.
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
## 2023-08-30 ENCOUNTER — APPOINTMENT (OUTPATIENT)
Dept: CARDIOLOGY | Facility: CLINIC | Age: 68
End: 2023-08-30

## 2023-09-05 ENCOUNTER — APPOINTMENT (OUTPATIENT)
Dept: PAIN MANAGEMENT | Facility: CLINIC | Age: 68
End: 2023-09-05

## 2023-09-07 NOTE — ASU PREOP CHECKLIST - TO WHOM
The patient has been examined and the H&P has been reviewed:    I concur with the findings and changes have been noted since the H&P was written: Confirmed this morning with daughter, and patient, Patient is right -hand dominant.  And history of R IJ TDC  infection.  Confirmed plan for AVG in LEFT arm.    Surgery risks, benefits and alternative options discussed and understood by patient/family.    
MASTER ORTIZ-PJ

## 2023-09-19 ENCOUNTER — APPOINTMENT (OUTPATIENT)
Dept: PAIN MANAGEMENT | Facility: CLINIC | Age: 68
End: 2023-09-19
Payer: MEDICARE

## 2023-09-19 PROCEDURE — 76942 ECHO GUIDE FOR BIOPSY: CPT

## 2023-09-19 PROCEDURE — 64417 NJX AA&/STRD AX NERVE IMG: CPT | Mod: 50

## 2023-09-19 PROCEDURE — 64418 NJX AA&/STRD SPRSCAP NRV: CPT | Mod: 50

## 2023-09-19 PROCEDURE — 94761 N-INVAS EAR/PLS OXIMETRY MLT: CPT

## 2023-09-26 ENCOUNTER — APPOINTMENT (OUTPATIENT)
Dept: PAIN MANAGEMENT | Facility: CLINIC | Age: 68
End: 2023-09-26
Payer: MEDICARE

## 2023-09-26 PROCEDURE — 94761 N-INVAS EAR/PLS OXIMETRY MLT: CPT

## 2023-09-26 PROCEDURE — 64418 NJX AA&/STRD SPRSCAP NRV: CPT | Mod: 50

## 2023-09-26 PROCEDURE — 64417 NJX AA&/STRD AX NERVE IMG: CPT | Mod: 50

## 2023-09-26 PROCEDURE — 76942 ECHO GUIDE FOR BIOPSY: CPT

## 2023-10-10 ENCOUNTER — APPOINTMENT (OUTPATIENT)
Dept: PAIN MANAGEMENT | Facility: CLINIC | Age: 68
End: 2023-10-10
Payer: MEDICARE

## 2023-10-10 PROCEDURE — 94761 N-INVAS EAR/PLS OXIMETRY MLT: CPT

## 2023-10-10 PROCEDURE — 64418 NJX AA&/STRD SPRSCAP NRV: CPT | Mod: 50

## 2023-10-10 PROCEDURE — 76942 ECHO GUIDE FOR BIOPSY: CPT

## 2023-10-10 PROCEDURE — 64417 NJX AA&/STRD AX NERVE IMG: CPT | Mod: 50

## 2023-10-10 RX ORDER — ASPIRIN ENTERIC COATED TABLETS 81 MG 81 MG/1
81 TABLET, DELAYED RELEASE ORAL
Qty: 90 | Refills: 2 | Status: ACTIVE | COMMUNITY
Start: 1900-01-01 | End: 1900-01-01

## 2023-10-12 RX ORDER — CLOPIDOGREL BISULFATE 75 MG/1
75 TABLET, FILM COATED ORAL DAILY
Qty: 90 | Refills: 2 | Status: ACTIVE | COMMUNITY
Start: 2023-03-21 | End: 1900-01-01

## 2023-10-19 ENCOUNTER — APPOINTMENT (OUTPATIENT)
Dept: PAIN MANAGEMENT | Facility: CLINIC | Age: 68
End: 2023-10-19

## 2023-10-31 NOTE — ED PROVIDER NOTE - NS ED ROS FT
Constitutional: (-) fever  Eyes/ENT: (-) blurry vision, (-) epistaxis  Cardiovascular: (+) chest pain, (-) syncope  Respiratory: (-) cough, (-) shortness of breath  Gastrointestinal: (-) vomiting, (-) diarrhea  Musculoskeletal: (-) neck pain, (-) back pain, (-) joint pain  Integumentary: (-) rash, (-) edema  Neurological: (-) headache, (-) altered mental status - - -

## 2023-11-02 ENCOUNTER — APPOINTMENT (OUTPATIENT)
Dept: PAIN MANAGEMENT | Facility: CLINIC | Age: 68
End: 2023-11-02

## 2023-12-05 ENCOUNTER — APPOINTMENT (OUTPATIENT)
Dept: PAIN MANAGEMENT | Facility: CLINIC | Age: 68
End: 2023-12-05
Payer: MEDICARE

## 2023-12-05 PROCEDURE — 99214 OFFICE O/P EST MOD 30 MIN: CPT

## 2024-01-16 ENCOUNTER — APPOINTMENT (OUTPATIENT)
Dept: ORTHOPEDIC SURGERY | Facility: CLINIC | Age: 69
End: 2024-01-16
Payer: MEDICARE

## 2024-01-16 PROCEDURE — 99213 OFFICE O/P EST LOW 20 MIN: CPT

## 2024-01-17 NOTE — REASON FOR VISIT
[FreeTextEntry2] : neck, back and right shoulder pain still on Plavix as nurse changed pain management doctor some MRIs are pending pain has been horrible No more weight loss needs the Robaxin

## 2024-01-17 NOTE — HISTORY OF PRESENT ILLNESS
[de-identified] : Chief Complaints\par  Patient Complaint - Pain posterior and on right side of her neck and shoulder also pain in her low back\par  Now retired reports still difficulty turning her neck and pain in the back is still present she still takes the medication with\par  good effectiveness continues to see pain management had RFA in the cervical spine in May really did  help \par  did get a recent cervical MRI 06/11/2021 does have follow-up with pain management thinks it is time to get another\par  injection

## 2024-01-17 NOTE — IMAGING
[de-identified] : Neck: \par  Inspection of the cervical spine is as follows: She has pain in the back which is unbelievable she has guarded motion\par  in all planes tested with spasm pain is particularly limited limiting her rotation. No focal findings in the upper extremity\par  shoulder motion is full \par  Back, including spine: Inspection of the thoracic/lumbar spine is as follows: Back as generalized tightness no\par  punch tenderness mild spasm no postural scoliosis tight dorsal lumbar fashion hamstrings hip motion is good negative\par  bowstring bilaterally reflexes brisk and symmetric

## 2024-01-17 NOTE — ASSESSMENT
[FreeTextEntry1] : continue pain management it  may be reasonable to consider additional cervical or lumbar injections   need to look at new MRI's also need to discuss with cards  holding plavix   robaxin refilled to today's visit I will see her in 4 months.

## 2024-01-30 ENCOUNTER — APPOINTMENT (OUTPATIENT)
Dept: CARDIOLOGY | Facility: CLINIC | Age: 69
End: 2024-01-30
Payer: MEDICARE

## 2024-01-30 VITALS
HEART RATE: 74 BPM | DIASTOLIC BLOOD PRESSURE: 80 MMHG | WEIGHT: 166 LBS | SYSTOLIC BLOOD PRESSURE: 142 MMHG | BODY MASS INDEX: 24.51 KG/M2

## 2024-01-30 DIAGNOSIS — E78.2 MIXED HYPERLIPIDEMIA: ICD-10-CM

## 2024-01-30 PROCEDURE — 93000 ELECTROCARDIOGRAM COMPLETE: CPT

## 2024-01-30 PROCEDURE — 99214 OFFICE O/P EST MOD 30 MIN: CPT

## 2024-01-30 RX ORDER — GABAPENTIN 300 MG/1
300 CAPSULE ORAL
Qty: 60 | Refills: 0 | Status: DISCONTINUED | COMMUNITY
Start: 2023-12-05 | End: 2024-01-30

## 2024-01-30 RX ORDER — ROSUVASTATIN CALCIUM 5 MG/1
5 TABLET, FILM COATED ORAL DAILY
Qty: 90 | Refills: 3 | Status: DISCONTINUED | COMMUNITY
Start: 2023-08-08 | End: 2024-01-30

## 2024-01-30 NOTE — ASSESSMENT
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

## 2024-01-30 NOTE — HISTORY OF PRESENT ILLNESS
[FreeTextEntry1] : 68 F with CAD s/p PCI pLAD on 3/1/2023, s/p PCI mRCA on 4/10/2023, HFimpEF 55%, anxiety.  Denies any CP, SOB, palpitations, orthopnea/PND, dizziness or syncope  Allergy to both Lisinopril or Rosuvastatin (hives)   TTE (8/2023):  EF >55%, no valvular disease

## 2024-02-13 ENCOUNTER — APPOINTMENT (OUTPATIENT)
Dept: PAIN MANAGEMENT | Facility: CLINIC | Age: 69
End: 2024-02-13

## 2024-04-17 NOTE — ED ADULT NURSE NOTE - IS THE PATIENT ABLE TO BE SCREENED?
04/17/24 1259   Service Assessment   Patient Orientation Alert and Oriented;Person;Place;Situation;Self   Cognition Alert   History Provided By Patient   Primary Caregiver Self   Support Systems Children   Patient's Healthcare Decision Maker is: Legal Next of Kin   PCP Verified by CM Yes  (Dr. Christopher updated from [facility] per pt)   Last Visit to PCP Within last 3 months   Prior Functional Level Independent in ADLs/IADLs   Current Functional Level Independent in ADLs/IADLs   Can patient return to prior living arrangement Yes   Ability to make needs known: Good   Family able to assist with home care needs: Yes   Would you like for me to discuss the discharge plan with any other family members/significant others, and if so, who? No   Financial Resources Medicaid  ([employer])   Discharge Planning   Type of Residence Apartment   Living Arrangements Children   Current Services Prior To Admission Oxygen Therapy  (Patient has her own portable, home aerosol, walker)   Potential Assistance Needed N/A   DME Ordered? No   Potential Assistance Purchasing Medications No   Type of Home Care Services None   Patient expects to be discharged to: Apartment   Follow Up Appointment: Best Day/Time    (anytime, counselor or medicaid ride)   One/Two Story Residence One story   Services At/After Discharge   Transition of Care Consult (CM Consult) Discharge Planning    Resource Information Provided? No   Mode of Transport at Discharge Other (see comment)  (family or friend)   Confirm Follow Up Transport Family   Condition of Participation: Discharge Planning   The Plan for Transition of Care is related to the following treatment goals: PCP   The Patient and/or Patient Representative was provided with a Choice of Provider? Patient   The Patient and/Or Patient Representative agree with the Discharge Plan? Yes   Freedom of Choice list was provided with basic dialogue that supports the patient's individualized plan of care/goals,  Yes

## 2024-04-30 ENCOUNTER — APPOINTMENT (OUTPATIENT)
Dept: ORTHOPEDIC SURGERY | Facility: CLINIC | Age: 69
End: 2024-04-30
Payer: MEDICARE

## 2024-04-30 DIAGNOSIS — M54.12 RADICULOPATHY, CERVICAL REGION: ICD-10-CM

## 2024-04-30 DIAGNOSIS — G89.4 CHRONIC PAIN SYNDROME: ICD-10-CM

## 2024-04-30 DIAGNOSIS — M77.8 OTHER ENTHESOPATHIES, NOT ELSEWHERE CLASSIFIED: ICD-10-CM

## 2024-04-30 DIAGNOSIS — M51.9 UNSPECIFIED THORACIC, THORACOLUMBAR AND LUMBOSACRAL INTERVERTEBRAL DISC DISORDER: ICD-10-CM

## 2024-04-30 PROCEDURE — G2211 COMPLEX E/M VISIT ADD ON: CPT

## 2024-04-30 PROCEDURE — 99213 OFFICE O/P EST LOW 20 MIN: CPT

## 2024-05-07 ENCOUNTER — APPOINTMENT (OUTPATIENT)
Dept: CARDIOLOGY | Facility: CLINIC | Age: 69
End: 2024-05-07
Payer: MEDICARE

## 2024-05-07 VITALS
BODY MASS INDEX: 25.4 KG/M2 | WEIGHT: 172 LBS | DIASTOLIC BLOOD PRESSURE: 80 MMHG | HEART RATE: 80 BPM | SYSTOLIC BLOOD PRESSURE: 140 MMHG

## 2024-05-07 DIAGNOSIS — I10 ESSENTIAL (PRIMARY) HYPERTENSION: ICD-10-CM

## 2024-05-07 DIAGNOSIS — Z95.5 PRESENCE OF CORONARY ANGIOPLASTY IMPLANT AND GRAFT: ICD-10-CM

## 2024-05-07 DIAGNOSIS — I25.10 ATHEROSCLEROTIC HEART DISEASE OF NATIVE CORONARY ARTERY W/OUT ANGINA PECTORIS: ICD-10-CM

## 2024-05-07 PROCEDURE — 93000 ELECTROCARDIOGRAM COMPLETE: CPT

## 2024-05-07 PROCEDURE — 99214 OFFICE O/P EST MOD 30 MIN: CPT

## 2024-05-07 RX ORDER — METOPROLOL TARTRATE 50 MG/1
50 TABLET, FILM COATED ORAL
Qty: 180 | Refills: 1 | Status: DISCONTINUED | COMMUNITY
End: 2024-05-07

## 2024-05-07 NOTE — DISCUSSION/SUMMARY
[FreeTextEntry1] : CAD s/p PCI pLAD on 3/1/2023, s/p PCI mRCA on 4/10/2023 HFimpEF 35 to 55%   Hold Plavix 5 days prior to epidural injections Do not stop aspirin Change to Metoprolol succinate 100 mg daily for compliance Routine labs Follow up 4-6 months

## 2024-05-07 NOTE — HISTORY OF PRESENT ILLNESS
[FreeTextEntry1] : 68 F with CAD s/p PCI pLAD on 3/1/2023, s/p PCI mRCA on 4/10/2023, HFimpEF 55%, anxiety.  No CP, SOB, palpitations, orthopnea/PND, dizziness or syncope  Previous allergy to either Lisinopril or Rosuvastatin (hives)   TTE (8/2023):  EF >55%, no valvular disease

## 2024-05-21 ENCOUNTER — APPOINTMENT (OUTPATIENT)
Dept: PAIN MANAGEMENT | Facility: CLINIC | Age: 69
End: 2024-05-21
Payer: MEDICARE

## 2024-05-21 DIAGNOSIS — M47.812 SPONDYLOSIS W/OUT MYELOPATHY OR RADICULOPATHY, CERVICAL REGION: ICD-10-CM

## 2024-05-21 DIAGNOSIS — M50.90 CERVICAL DISC DISORDER, UNSPECIFIED, UNSPECIFIED CERVICAL REGION: ICD-10-CM

## 2024-05-21 DIAGNOSIS — M54.16 RADICULOPATHY, LUMBAR REGION: ICD-10-CM

## 2024-05-21 PROCEDURE — 99214 OFFICE O/P EST MOD 30 MIN: CPT

## 2024-05-22 PROBLEM — M54.16 LUMBAR RADICULITIS: Status: ACTIVE | Noted: 2023-12-05

## 2024-05-22 PROBLEM — M47.812 CERVICAL SPONDYLOSIS: Status: ACTIVE | Noted: 2023-06-01

## 2024-05-22 PROBLEM — M50.90 CERVICAL DISC DISEASE: Status: ACTIVE | Noted: 2022-08-19

## 2024-05-22 NOTE — PHYSICAL EXAM
[de-identified] : C spine  No rashes, erythema, edema noted TTP b/l cervical paraspinal and trapezius muscles Positive spurlings bilaterally RUE: 5/5 strength LUE: 5/5 strength   L spine   No rashes, erythema, edema noted TTP b/l lumbar paraspinals and sciatic notch Positive straight leg raise bilaterally LLE: 5/5 strength RLE: 5/5 strength Sensation intact b/l LE

## 2024-05-22 NOTE — DISCUSSION/SUMMARY
[de-identified] : A discussion regarding available pain management treatment options occurred with the patient. These included interventional, rehabilitative, pharmacological, and alternative modalities. We will proceed with the following:   Interventional treatment options: - Plan is to proceed with a C7-T1 cervical epidural steroid injection. I will need an updated MRI of the cervical spine before moving forward as her MRI is old.  Treatment options were discussed. The patient has been having persistent, severe neck pain and cervical radicular pain that has minimally improved with conservative therapy thus far (including physical therapy/chiropractic manipulations/home stretching and anti-inflammatory medications for 8 weeks. The patient was given the option of proceeding with a cervical epidural steroid injection to try to get the patient some pain relief.   The risks and benefits were discussed, which included the associated problems with steroids, bleeding, nerve injury, lack of improvement with pain, and 0.5% chance of a post dural puncture headache.     * Of note, she is on Plavix. We will reach out to Dr. Caal to obtain clearance before moving forward with this injection.   Imagin. The patient has been having persistent cervical radicular pain. At this point we decided to proceed with an MRI of the cervical spine without contrast to evaluate the pathology and give the patient treatment options to help with the pain. Potential treatment options such as further conservative therapy, injections, and surgery were also briefly discussed.   - Follow up in 2 weeks post injection.   I Bebe Live attest that this documentation has been prepared under the direction and in the presence of provider Dr. Raman Box.  The documentation recorded by the scribe in my presence, accurately reflects the service I personally performed, and the decisions made by me with my edits as appropriate.   Raman Box, DO
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
## 2024-05-28 RX ORDER — METHOCARBAMOL 500 MG/1
500 TABLET, FILM COATED ORAL 3 TIMES DAILY
Qty: 90 | Refills: 1 | Status: ACTIVE | COMMUNITY
Start: 2023-06-06 | End: 1900-01-01

## 2024-06-13 ENCOUNTER — APPOINTMENT (OUTPATIENT)
Dept: PAIN MANAGEMENT | Facility: CLINIC | Age: 69
End: 2024-06-13
Payer: MEDICARE

## 2024-06-13 DIAGNOSIS — M54.12 RADICULOPATHY, CERVICAL REGION: ICD-10-CM

## 2024-06-13 PROCEDURE — 00600 ANES PX CRV SPINE&CORD NOS: CPT | Mod: QZ,P3

## 2024-06-13 PROCEDURE — 62321 NJX INTERLAMINAR CRV/THRC: CPT

## 2024-06-17 PROBLEM — M54.12 CERVICAL RADICULITIS: Status: ACTIVE | Noted: 2024-05-22

## 2024-06-17 NOTE — PROCEDURE
[FreeTextEntry3] : Date of Service: 06/13/2024   Account: [id number]  Patient: DENISE BILLINGSLEY   YOB: 1955  Age: 68 year  Surgeon:      Raman Box D.O.  Assistant:    None  Pre-Operative Diagnosis:         Cervical Radiculopathy (M54.12)  Post Operative Diagnosis:       Cervical Radiculopathy (M54.12)  Procedure:             Cervical (C7-T1) interlaminar epidural steroid injection under fluoroscopic guidance  Anesthesia:  MAC  This procedure was carried out using fluoroscopic guidance.  The risks and benefits of the procedure were discussed extensively with the patient.  The consent of the patient was obtained and the following procedure was performed. The patient was placed in the prone position on the fluoroscopy table and the area was prepped and draped in a sterile fashion.  A timeout was performed with all essential staff present and the site and side were verified.  The patient was placed in the prone position and optimized to patient comfort.  The cervical area was prepped and draped in a sterile fashion.  The fluoroscope visualized the C7-T1 interspace using slight cephalad-caudad angulation and this area was marked.  Using sterile technique the superficial skin was anesthetized with 1% Lidocaine.  A 20 gauge 3.5 inch Tuohy needle was advanced under fluoroscopy until ligament was engaged.  Using a contralateral oblique view, a "loss of resistance" to air technique was utilized in order to gain access to the epidural space.  After negative aspiration for heme and CSF, 1 cc of Omnipaque contrast was administered and the appropriate cervical epidurogram was obtained in the CHRISTIAN and A/P view as well as digital subtraction angiography.  A total injectate of 3 cc of preservative free normal saline and 10mg decadron was then injected into the epidural space while maintaining meaningful verbal contact with the patient.    The needle was subsequently removed.  Vital signs remained normal.  Pulse oximeter was used throughout the procedure and the patient's pulse and oxygen saturation remained within normal limits.  The patient tolerated the procedure well.  There were no complications.  The patient was instructed to apply ice over the injection sites for twenty minutes every two hours for the next 24 to 48 hours.  Disposition:       1. The patient was advised to F/U in 1-2 weeks to assess the response to the injection.      2. The patient was also instructed to contact me immediately if there were any concerns related to the procedure performed.

## 2024-06-21 RX ORDER — HYDROCODONE BITARTRATE AND ACETAMINOPHEN 5; 325 MG/1; MG/1
5-325 TABLET ORAL
Qty: 120 | Refills: 0 | Status: ACTIVE | COMMUNITY
Start: 2023-06-06 | End: 1900-01-01

## 2024-06-21 RX ORDER — METOPROLOL SUCCINATE 100 MG/1
100 TABLET, EXTENDED RELEASE ORAL DAILY
Qty: 90 | Refills: 3 | Status: ACTIVE | COMMUNITY
Start: 2024-05-07 | End: 1900-01-01

## 2024-07-02 NOTE — ED PROVIDER NOTE - PRO INTERPRETER NEED 2
3 cores obtained.   
Right Atrium Pressure: 16 mm hg   
Transjugular liver biopsy pressure readings:    Hepatic Vein Pressure 14 mm hg     Wedge Pressure 18 mm hg    
English

## 2024-07-11 RX ORDER — HYDROCODONE BITARTRATE AND ACETAMINOPHEN 10; 325 MG/1; MG/1
10-325 TABLET ORAL
Qty: 60 | Refills: 0 | Status: ACTIVE | COMMUNITY
Start: 2024-07-11 | End: 1900-01-01

## 2024-07-16 RX ORDER — METOPROLOL TARTRATE 50 MG/1
50 TABLET, FILM COATED ORAL TWICE DAILY
Qty: 180 | Refills: 3 | Status: ACTIVE | COMMUNITY
Start: 2024-07-16 | End: 1900-01-01

## 2024-07-24 ENCOUNTER — APPOINTMENT (OUTPATIENT)
Dept: PAIN MANAGEMENT | Facility: CLINIC | Age: 69
End: 2024-07-24
Payer: MEDICARE

## 2024-07-24 DIAGNOSIS — M47.812 SPONDYLOSIS W/OUT MYELOPATHY OR RADICULOPATHY, CERVICAL REGION: ICD-10-CM

## 2024-07-24 PROCEDURE — 99213 OFFICE O/P EST LOW 20 MIN: CPT

## 2024-07-25 NOTE — DISCUSSION/SUMMARY
[de-identified] : A discussion regarding available pain management treatment options occurred with the patient. These included interventional, rehabilitative, pharmacological, and alternative modalities. We will proceed with the following:   We will hold off further injection   Total clinician time spent today on the patient is 20 minutes including preparing to see the patient, obtaining and/or reviewing and confirming history, performing medically necessary and appropriate examination, counseling and educating the patient and/or family, documenting clinical information in the EHR and communicating and/or referring to other healthcare professionals.
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
## 2024-07-25 NOTE — PHYSICAL EXAM
[de-identified] : C spine  No rashes, erythema, edema noted TTP b/l cervical paraspinal and trapezius muscles Positive spurlings bilaterally RUE: 5/5 strength LUE: 5/5 strength   L spine   No rashes, erythema, edema noted TTP b/l lumbar paraspinals and sciatic notch Positive straight leg raise bilaterally LLE: 5/5 strength RLE: 5/5 strength Sensation intact b/l LE

## 2024-07-25 NOTE — DATA REVIEWED
[FreeTextEntry1] : MRI Cervical - C2-3 disc bulge causing a small ventral impression upon the thecal sac. C3-4 disc herniation which impinges upon the thecal sac. C4-5 disc bulge which impinges upon the thecal sac. C5-6 and C6-7 disc bulges causing moderate ventral impression upon the thecal sac. Mild central canal stenosis at C5-C6 and C6-7. Straightening of the cervical spine. Degenerative disease. C3 disc bulge causing moderate ventral impression upon the thecal sac. She thinks or disc bulge causing small ventral impression upon the thecal sac. Metronidazole Counseling:  I discussed with the patient the risks of metronidazole including but not limited to seizures, nausea/vomiting, a metallic taste in the mouth, nausea/vomiting and severe allergy.

## 2024-07-25 NOTE — PHYSICAL EXAM
[de-identified] : C spine  No rashes, erythema, edema noted TTP b/l cervical paraspinal and trapezius muscles Positive spurlings bilaterally RUE: 5/5 strength LUE: 5/5 strength   L spine   No rashes, erythema, edema noted TTP b/l lumbar paraspinals and sciatic notch Positive straight leg raise bilaterally LLE: 5/5 strength RLE: 5/5 strength Sensation intact b/l LE

## 2024-07-25 NOTE — DISCUSSION/SUMMARY
[de-identified] : A discussion regarding available pain management treatment options occurred with the patient. These included interventional, rehabilitative, pharmacological, and alternative modalities. We will proceed with the following:   We will hold off further injection   Total clinician time spent today on the patient is 20 minutes including preparing to see the patient, obtaining and/or reviewing and confirming history, performing medically necessary and appropriate examination, counseling and educating the patient and/or family, documenting clinical information in the EHR and communicating and/or referring to other healthcare professionals.
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
## 2024-07-30 ENCOUNTER — APPOINTMENT (OUTPATIENT)
Dept: ORTHOPEDIC SURGERY | Facility: CLINIC | Age: 69
End: 2024-07-30
Payer: MEDICARE

## 2024-07-30 DIAGNOSIS — M51.9 UNSPECIFIED THORACIC, THORACOLUMBAR AND LUMBOSACRAL INTERVERTEBRAL DISC DISORDER: ICD-10-CM

## 2024-07-30 DIAGNOSIS — G89.4 CHRONIC PAIN SYNDROME: ICD-10-CM

## 2024-07-30 DIAGNOSIS — M50.90 CERVICAL DISC DISORDER, UNSPECIFIED, UNSPECIFIED CERVICAL REGION: ICD-10-CM

## 2024-07-30 DIAGNOSIS — M54.12 RADICULOPATHY, CERVICAL REGION: ICD-10-CM

## 2024-07-30 PROCEDURE — 99213 OFFICE O/P EST LOW 20 MIN: CPT

## 2024-07-30 PROCEDURE — G2211 COMPLEX E/M VISIT ADD ON: CPT

## 2024-07-30 NOTE — HISTORY OF PRESENT ILLNESS
[de-identified] : Chief Complaints\par  Patient Complaint - Pain posterior and on right side of her neck and shoulder also pain in her low back\par  Now retired reports still difficulty turning her neck and pain in the back is still present she still takes the medication with\par  good effectiveness continues to see pain management had RFA in the cervical spine in May really did  help \par  did get a recent cervical MRI 06/11/2021 does have follow-up with pain management thinks it is time to get another\par  injection

## 2024-07-30 NOTE — REASON FOR VISIT
[FreeTextEntry2] : Patient is here today for neck, back and right shoulder pain had a cervical epidural June helped a little bit reportedly can have another 1 until September still on Plavix baby aspirin

## 2024-07-30 NOTE — IMAGING
[de-identified] : Neck: \par  Inspection of the cervical spine is as follows: She has pain in the back which is unbelievable she has guarded motion\par  in all planes tested with spasm pain is particularly limited limiting her rotation. No focal findings in the upper extremity\par  shoulder motion is full \par  Back, including spine: Inspection of the thoracic/lumbar spine is as follows: Back as generalized tightness no\par  punch tenderness mild spasm no postural scoliosis tight dorsal lumbar fashion hamstrings hip motion is good negative\par  bowstring bilaterally reflexes brisk and symmetric

## 2024-07-30 NOTE — ASSESSMENT
[FreeTextEntry1] : continue pain management  it  may be reasonable to consider additional cervical or lumbar injections     need to discuss with cards  holding and or dcingd plavix   robaxin refilled to today's visit I will see her in 4 months.  She is retired  may be at this time able to stop the Plavix completely

## 2024-07-30 NOTE — HISTORY OF PRESENT ILLNESS
[de-identified] : Chief Complaints\par  Patient Complaint - Pain posterior and on right side of her neck and shoulder also pain in her low back\par  Now retired reports still difficulty turning her neck and pain in the back is still present she still takes the medication with\par  good effectiveness continues to see pain management had RFA in the cervical spine in May really did  help \par  did get a recent cervical MRI 06/11/2021 does have follow-up with pain management thinks it is time to get another\par  injection

## 2024-07-30 NOTE — IMAGING
[de-identified] : Neck: \par  Inspection of the cervical spine is as follows: She has pain in the back which is unbelievable she has guarded motion\par  in all planes tested with spasm pain is particularly limited limiting her rotation. No focal findings in the upper extremity\par  shoulder motion is full \par  Back, including spine: Inspection of the thoracic/lumbar spine is as follows: Back as generalized tightness no\par  punch tenderness mild spasm no postural scoliosis tight dorsal lumbar fashion hamstrings hip motion is good negative\par  bowstring bilaterally reflexes brisk and symmetric

## 2024-09-11 NOTE — ASSESSMENT
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  I will proceed with a bilateral suprascapular and axillary nerve block under ultrasound guidance.  She will follow-up in 4 weeks for reassessment.   Entered by Bebe Live, acting as scribe for Dr. Harvey.   The documentation recorded by the scribe, in my presence, accurately reflects the service I personally performed, and the decisions made by me with my edits as appropriate.   Best Regards,  Fermin Harvey MD  Board Certified, Anesthesiology  Board Certified, Pain Medicine     PROVIDER:[TOKEN:[[id number]]]

## 2024-09-12 ENCOUNTER — APPOINTMENT (OUTPATIENT)
Dept: PAIN MANAGEMENT | Facility: CLINIC | Age: 69
End: 2024-09-12

## 2024-10-04 ENCOUNTER — APPOINTMENT (OUTPATIENT)
Dept: PAIN MANAGEMENT | Facility: CLINIC | Age: 69
End: 2024-10-04
Payer: MEDICARE

## 2024-10-04 DIAGNOSIS — M47.812 SPONDYLOSIS W/OUT MYELOPATHY OR RADICULOPATHY, CERVICAL REGION: ICD-10-CM

## 2024-10-04 DIAGNOSIS — M54.12 RADICULOPATHY, CERVICAL REGION: ICD-10-CM

## 2024-10-04 PROCEDURE — 99214 OFFICE O/P EST MOD 30 MIN: CPT

## 2024-10-04 NOTE — PHYSICAL EXAM
[de-identified] : C spine  No rashes, erythema, edema noted TTP b/l cervical paraspinal and trapezius muscles Positive spurlings bilaterally RUE: 5/5 strength LUE: 5/5 strength
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
## 2024-10-16 NOTE — DISCUSSION/SUMMARY
Detail Level: Zone Initiate Treatment: Recommend broad spectrum sunscreen with SPF 30 or greater daily on face and chest and more areas if outside. Reapply every 2 hours or 80 minutes if in water [de-identified] : A discussion regarding available pain management treatment options occurred with the patient. These included interventional, rehabilitative, pharmacological, and alternative modalities. We will proceed with the following:   Interventional/ Procedures: 1. Proceed with JADYN with MAC Patient is on Plavix and aspirin, patient was instructed to stop both 7 days prior to the injection - Patient had an MRI that showed a radicular component along with pain referred to the upper extremity. I will schedule a cervical epidural steroid  1-3 depending on effectiveness. Treatment options were discussed. The patient has been having persistent, severe neck pain and cervical radicular pain that has minimally improved with conservative therapy thus far (including physical therapy/chiropractic manipulations/home stretching and anti-inflammatory medications for 8 weeks. The patient was given the option of proceeding with a cervical epidural steroid injection to try to get the patient some pain relief.   The risks and benefits were discussed, which included the associated problems with steroids, bleeding, nerve injury, lack of improvement with pain, and 0.5% chance of a post dural puncture headache.   - The patient has severe anxiety of procedures that necessitates monitored anesthesia care (MAC). The procedure performed will be close to major nerves, arteries, and spinal cord and/or joint structures. Due to the proximity of these structures, we need the patient to be still during the procedure. With the help of MAC, this will be safely achieved and decrease the risk of any complications.   Rehabilitative/alternative modalities: 1. Initiate physician directed activity and lifestyle modifications. 2. Participation in active HEP was discussed and printed. 3. Patient was advised to stay away from any heavy lifting. If needed, she was advised to squat and not bend forward.    Total clinician time spent today on the patient is 30 minutes including preparing to see the patient, obtaining and/or reviewing and confirming history, performing medically necessary and appropriate examination, counseling and educating the patient and/or family, documenting clinical information in the EHR and communicating and/or referring to other healthcare professionals.  BRYN lForian DO

## 2024-11-05 ENCOUNTER — APPOINTMENT (OUTPATIENT)
Dept: CARDIOLOGY | Facility: CLINIC | Age: 69
End: 2024-11-05
Payer: MEDICARE

## 2024-11-05 ENCOUNTER — NON-APPOINTMENT (OUTPATIENT)
Age: 69
End: 2024-11-05

## 2024-11-05 VITALS
SYSTOLIC BLOOD PRESSURE: 110 MMHG | DIASTOLIC BLOOD PRESSURE: 70 MMHG | HEIGHT: 69 IN | WEIGHT: 166 LBS | BODY MASS INDEX: 24.59 KG/M2

## 2024-11-05 DIAGNOSIS — I25.10 ATHEROSCLEROTIC HEART DISEASE OF NATIVE CORONARY ARTERY W/OUT ANGINA PECTORIS: ICD-10-CM

## 2024-11-05 DIAGNOSIS — Z95.5 PRESENCE OF CORONARY ANGIOPLASTY IMPLANT AND GRAFT: ICD-10-CM

## 2024-11-05 DIAGNOSIS — I10 ESSENTIAL (PRIMARY) HYPERTENSION: ICD-10-CM

## 2024-11-05 PROCEDURE — G2211 COMPLEX E/M VISIT ADD ON: CPT

## 2024-11-05 PROCEDURE — 93000 ELECTROCARDIOGRAM COMPLETE: CPT

## 2024-11-05 PROCEDURE — 99214 OFFICE O/P EST MOD 30 MIN: CPT

## 2024-11-06 ENCOUNTER — NON-APPOINTMENT (OUTPATIENT)
Age: 69
End: 2024-11-06

## 2024-11-11 ENCOUNTER — APPOINTMENT (OUTPATIENT)
Dept: PAIN MANAGEMENT | Facility: CLINIC | Age: 69
End: 2024-11-11

## 2024-11-21 ENCOUNTER — APPOINTMENT (OUTPATIENT)
Dept: ORTHOPEDIC SURGERY | Facility: CLINIC | Age: 69
End: 2024-11-21
Payer: MEDICARE

## 2024-11-21 DIAGNOSIS — M51.9 UNSPECIFIED THORACIC, THORACOLUMBAR AND LUMBOSACRAL INTERVERTEBRAL DISC DISORDER: ICD-10-CM

## 2024-11-21 DIAGNOSIS — M54.12 RADICULOPATHY, CERVICAL REGION: ICD-10-CM

## 2024-11-21 DIAGNOSIS — M77.8 OTHER ENTHESOPATHIES, NOT ELSEWHERE CLASSIFIED: ICD-10-CM

## 2024-11-21 DIAGNOSIS — M54.16 RADICULOPATHY, LUMBAR REGION: ICD-10-CM

## 2024-11-21 DIAGNOSIS — M50.90 CERVICAL DISC DISORDER, UNSPECIFIED, UNSPECIFIED CERVICAL REGION: ICD-10-CM

## 2024-11-21 PROCEDURE — 99213 OFFICE O/P EST LOW 20 MIN: CPT

## 2024-11-21 PROCEDURE — G2211 COMPLEX E/M VISIT ADD ON: CPT

## 2024-11-25 ENCOUNTER — APPOINTMENT (OUTPATIENT)
Dept: PAIN MANAGEMENT | Facility: CLINIC | Age: 69
End: 2024-11-25
Payer: MEDICARE

## 2024-11-25 ENCOUNTER — APPOINTMENT (OUTPATIENT)
Facility: CLINIC | Age: 69
End: 2024-11-25
Payer: MEDICARE

## 2024-11-25 DIAGNOSIS — M54.12 RADICULOPATHY, CERVICAL REGION: ICD-10-CM

## 2024-11-25 PROCEDURE — 62321 NJX INTERLAMINAR CRV/THRC: CPT

## 2024-11-25 PROCEDURE — 00600 ANES PX CRV SPINE&CORD NOS: CPT | Mod: QZ,P2

## 2024-12-18 ENCOUNTER — APPOINTMENT (OUTPATIENT)
Dept: PAIN MANAGEMENT | Facility: CLINIC | Age: 69
End: 2024-12-18
Payer: MEDICARE

## 2024-12-18 VITALS — WEIGHT: 166 LBS | HEIGHT: 69 IN | BODY MASS INDEX: 24.59 KG/M2

## 2024-12-18 DIAGNOSIS — M54.59 OTHER LOW BACK PAIN: ICD-10-CM

## 2024-12-18 DIAGNOSIS — M47.819 SPONDYLOSIS W/OUT MYELOPATHY OR RADICULOPATHY, SITE UNSPECIFIED: ICD-10-CM

## 2024-12-18 PROCEDURE — 99214 OFFICE O/P EST MOD 30 MIN: CPT

## 2025-01-13 ENCOUNTER — APPOINTMENT (OUTPATIENT)
Dept: PAIN MANAGEMENT | Facility: CLINIC | Age: 70
End: 2025-01-13

## 2025-01-29 ENCOUNTER — APPOINTMENT (OUTPATIENT)
Dept: PAIN MANAGEMENT | Facility: CLINIC | Age: 70
End: 2025-01-29

## 2025-01-31 ENCOUNTER — APPOINTMENT (OUTPATIENT)
Dept: PAIN MANAGEMENT | Facility: CLINIC | Age: 70
End: 2025-01-31
Payer: MEDICARE

## 2025-01-31 ENCOUNTER — APPOINTMENT (OUTPATIENT)
Facility: CLINIC | Age: 70
End: 2025-01-31

## 2025-01-31 DIAGNOSIS — M47.816 SPONDYLOSIS W/OUT MYELOPATHY OR RADICULOPATHY, LUMBAR REGION: ICD-10-CM

## 2025-01-31 PROCEDURE — 00630 ANES PX LUMBAR REGION NOS: CPT | Mod: QZ,P3

## 2025-01-31 PROCEDURE — 64494 INJ PARAVERT F JNT L/S 2 LEV: CPT | Mod: 50

## 2025-01-31 PROCEDURE — 64493 INJ PARAVERT F JNT L/S 1 LEV: CPT | Mod: 50

## 2025-02-10 ENCOUNTER — APPOINTMENT (OUTPATIENT)
Facility: CLINIC | Age: 70
End: 2025-02-10

## 2025-02-10 ENCOUNTER — APPOINTMENT (OUTPATIENT)
Dept: PAIN MANAGEMENT | Facility: CLINIC | Age: 70
End: 2025-02-10

## 2025-03-07 ENCOUNTER — APPOINTMENT (OUTPATIENT)
Dept: PAIN MANAGEMENT | Facility: CLINIC | Age: 70
End: 2025-03-07
Payer: MEDICARE

## 2025-03-07 DIAGNOSIS — M47.812 SPONDYLOSIS W/OUT MYELOPATHY OR RADICULOPATHY, CERVICAL REGION: ICD-10-CM

## 2025-03-07 DIAGNOSIS — M54.12 RADICULOPATHY, CERVICAL REGION: ICD-10-CM

## 2025-03-07 DIAGNOSIS — M54.16 RADICULOPATHY, LUMBAR REGION: ICD-10-CM

## 2025-03-07 PROCEDURE — 99214 OFFICE O/P EST MOD 30 MIN: CPT

## 2025-03-07 RX ORDER — DIAZEPAM 5 MG/1
5 TABLET ORAL
Qty: 2 | Refills: 0 | Status: ACTIVE | COMMUNITY
Start: 2025-03-07 | End: 1900-01-01

## 2025-03-20 ENCOUNTER — APPOINTMENT (OUTPATIENT)
Dept: ORTHOPEDIC SURGERY | Facility: CLINIC | Age: 70
End: 2025-03-20
Payer: MEDICARE

## 2025-03-20 DIAGNOSIS — M50.90 CERVICAL DISC DISORDER, UNSPECIFIED, UNSPECIFIED CERVICAL REGION: ICD-10-CM

## 2025-03-20 DIAGNOSIS — M51.9 UNSPECIFIED THORACIC, THORACOLUMBAR AND LUMBOSACRAL INTERVERTEBRAL DISC DISORDER: ICD-10-CM

## 2025-03-20 DIAGNOSIS — M77.8 OTHER ENTHESOPATHIES, NOT ELSEWHERE CLASSIFIED: ICD-10-CM

## 2025-03-20 DIAGNOSIS — M54.12 RADICULOPATHY, CERVICAL REGION: ICD-10-CM

## 2025-03-20 DIAGNOSIS — M54.16 RADICULOPATHY, LUMBAR REGION: ICD-10-CM

## 2025-03-20 PROCEDURE — 99213 OFFICE O/P EST LOW 20 MIN: CPT

## 2025-03-20 PROCEDURE — G2211 COMPLEX E/M VISIT ADD ON: CPT

## 2025-04-07 ENCOUNTER — APPOINTMENT (OUTPATIENT)
Dept: PAIN MANAGEMENT | Facility: CLINIC | Age: 70
End: 2025-04-07

## 2025-04-25 ENCOUNTER — APPOINTMENT (OUTPATIENT)
Dept: PAIN MANAGEMENT | Facility: CLINIC | Age: 70
End: 2025-04-25

## 2025-05-13 ENCOUNTER — APPOINTMENT (OUTPATIENT)
Dept: CARDIOLOGY | Facility: CLINIC | Age: 70
End: 2025-05-13
Payer: COMMERCIAL

## 2025-05-13 VITALS
SYSTOLIC BLOOD PRESSURE: 128 MMHG | WEIGHT: 168 LBS | HEART RATE: 80 BPM | BODY MASS INDEX: 24.88 KG/M2 | HEIGHT: 69 IN | DIASTOLIC BLOOD PRESSURE: 80 MMHG

## 2025-05-13 DIAGNOSIS — I25.10 ATHEROSCLEROTIC HEART DISEASE OF NATIVE CORONARY ARTERY W/OUT ANGINA PECTORIS: ICD-10-CM

## 2025-05-13 DIAGNOSIS — E78.5 HYPERLIPIDEMIA, UNSPECIFIED: ICD-10-CM

## 2025-05-13 DIAGNOSIS — I10 ESSENTIAL (PRIMARY) HYPERTENSION: ICD-10-CM

## 2025-05-13 DIAGNOSIS — Z95.5 PRESENCE OF CORONARY ANGIOPLASTY IMPLANT AND GRAFT: ICD-10-CM

## 2025-05-13 PROCEDURE — 99214 OFFICE O/P EST MOD 30 MIN: CPT

## 2025-05-13 PROCEDURE — 99204 OFFICE O/P NEW MOD 45 MIN: CPT

## 2025-05-13 PROCEDURE — 93000 ELECTROCARDIOGRAM COMPLETE: CPT

## 2025-05-13 PROCEDURE — G2211 COMPLEX E/M VISIT ADD ON: CPT | Mod: NC

## 2025-05-13 RX ORDER — ROSUVASTATIN CALCIUM 5 MG/1
5 TABLET, FILM COATED ORAL DAILY
Qty: 30 | Refills: 0 | Status: ACTIVE | COMMUNITY
Start: 2025-05-13 | End: 1900-01-01

## 2025-06-03 NOTE — ED ADULT NURSE NOTE - NSIMPLEMENTINTERV_GEN_ALL_ED
Diastolic CHF  Lasix as needed  On metoprolol 100 mg daily   Implemented All Universal Safety Interventions:  [place] to call system. Call bell, personal items and telephone within reach. Instruct patient to call for assistance. Room bathroom lighting operational. Non-slip footwear when patient is off stretcher. Physically safe environment: no spills, clutter or unnecessary equipment. Stretcher in lowest position, wheels locked, appropriate side rails in place.

## 2025-07-17 ENCOUNTER — APPOINTMENT (OUTPATIENT)
Dept: ORTHOPEDIC SURGERY | Facility: CLINIC | Age: 70
End: 2025-07-17
Payer: MEDICARE

## 2025-07-17 PROCEDURE — G2211 COMPLEX E/M VISIT ADD ON: CPT

## 2025-07-17 PROCEDURE — 99213 OFFICE O/P EST LOW 20 MIN: CPT
